# Patient Record
Sex: MALE | Race: WHITE | NOT HISPANIC OR LATINO | Employment: UNEMPLOYED | URBAN - METROPOLITAN AREA
[De-identification: names, ages, dates, MRNs, and addresses within clinical notes are randomized per-mention and may not be internally consistent; named-entity substitution may affect disease eponyms.]

---

## 2021-04-30 ENCOUNTER — IMMUNIZATIONS (OUTPATIENT)
Dept: FAMILY MEDICINE CLINIC | Facility: HOSPITAL | Age: 31
End: 2021-04-30

## 2021-04-30 DIAGNOSIS — Z23 ENCOUNTER FOR IMMUNIZATION: Primary | ICD-10-CM

## 2021-04-30 PROCEDURE — 91300 SARS-COV-2 / COVID-19 MRNA VACCINE (PFIZER-BIONTECH) 30 MCG: CPT

## 2021-04-30 PROCEDURE — 0001A SARS-COV-2 / COVID-19 MRNA VACCINE (PFIZER-BIONTECH) 30 MCG: CPT

## 2021-05-21 ENCOUNTER — IMMUNIZATIONS (OUTPATIENT)
Dept: FAMILY MEDICINE CLINIC | Facility: HOSPITAL | Age: 31
End: 2021-05-21

## 2021-05-21 DIAGNOSIS — Z23 ENCOUNTER FOR IMMUNIZATION: Primary | ICD-10-CM

## 2021-05-21 PROCEDURE — 91300 SARS-COV-2 / COVID-19 MRNA VACCINE (PFIZER-BIONTECH) 30 MCG: CPT

## 2021-05-21 PROCEDURE — 0002A SARS-COV-2 / COVID-19 MRNA VACCINE (PFIZER-BIONTECH) 30 MCG: CPT

## 2022-06-30 ENCOUNTER — OFFICE VISIT (OUTPATIENT)
Dept: NEUROSURGERY | Facility: CLINIC | Age: 32
End: 2022-06-30
Payer: COMMERCIAL

## 2022-06-30 VITALS
BODY MASS INDEX: 22.96 KG/M2 | SYSTOLIC BLOOD PRESSURE: 110 MMHG | TEMPERATURE: 97.6 F | HEIGHT: 69 IN | WEIGHT: 155 LBS | RESPIRATION RATE: 16 BRPM | HEART RATE: 73 BPM | DIASTOLIC BLOOD PRESSURE: 87 MMHG

## 2022-06-30 DIAGNOSIS — G93.0 ARACHNOID CYST OF POSTERIOR CRANIAL FOSSA: Primary | ICD-10-CM

## 2022-06-30 PROCEDURE — 99203 OFFICE O/P NEW LOW 30 MIN: CPT | Performed by: PHYSICIAN ASSISTANT

## 2022-06-30 RX ORDER — FLUTICASONE PROPIONATE 50 MCG
SPRAY, SUSPENSION (ML) NASAL
COMMUNITY
Start: 2022-04-19

## 2022-06-30 RX ORDER — MELOXICAM 15 MG/1
TABLET ORAL
COMMUNITY
Start: 2022-06-06

## 2022-06-30 NOTE — PROGRESS NOTES
Neurosurgery Office Note  Galen Ga 32 y o  male MRN: [id number]      Assessment/Plan   Patient is a 32 yrs old young gentleman self referred for Abnormal MRI that was ordered by his ENT Dr for workups of  right ear hearing issues and left ear tinnitus for the past 2 yrs  Patient reports the tingling is annoying and he is not getting sleep at night  MRI done shows an incidental retirocerebellar Arachnoid cyst measuring 2 4 x 1 8 x 3 2 cm right retro cerebellar region  No lesion in the IAC  Patient denies any headache, nausea, vomiting, gait issues, tremors, seizures, speech or swallowing problems  Denies dizziness/vertigo  No weakness in the limbs  No B/B dysfunction  Patient is following up with his ENT Dr  for his hearing problem and tinnitus  Exam- A&OX3, PERRL, EOMI 2 mm conj bilaterally  SF  Tiffany  Finger to nose test normal and without drift bilaterally  Strength 5/5 and sensation to LT intact throughout  DTR 2+  No clonus in both legs  Gait stable on heel to toes walking  Negative Romberg's test     Hx, PEx and images reviewed with the patient  Mx plan discussed  Dr Lili Sawyer reviewed the images  His symptoms is not related to his Arachnoid cyst  and recommends no surgical intervention or regular  follow up images  is necessary  Advised patient to follow up with his ENT Dr  Call office with new neurological symptoms or go to ER  All questions were answered to patient's satisfaction  Patient expressed his understandings and  agreed with the plan  Plan:   1  F/U MRI with and without in 6 months  2  Advised to follow up with his ENT Dr  3  Please go to ER with new neurological symptoms  4  Call with question or concern  5  Otherwise, follow up on PRN basis  I spent 30 minutes with the patient today in which >50% of the time was spent counseling/coordination of care regarding diagnosis, imaging review, symptoms and treatment plan       C/C: " self referred for Abnormal brain MRI"    Chief Complaint Patient presents with    Consult     NP MRI BRAIN AND IAC 6/15/22 [facility] in PACS           History of Present Illness     Patient is a 32 yrs old young gentleman with significant past medical history - self referred for Abnormal MRI that was ordered by his ENT Dr for workups of  right ear hearing issues and left ear tinnitus for the past 2 yrs  Patient reports the tingling is annoying and he is not getting sleep at night  MRI done shows an incidental retirocerebellar Arachnoid cyst  No lesion in the IAC  Patient denies any headache, nausea, vomiting, gait issues, tremors, seizures, speech or swallowing problems  Denies dizziness/vertigo  No weakness in the limbs  No B/B dysfunction  Patient is following up with his ENT Dr  for his hearing problem and tinnitus  Patient denies diabetes mellitus, hypertension, congestive heart failure, stroke, seizures, disorders, or taking anticoagulant medication  As fever, chills, rigors, cough or chest-pain  Denies history of smoking cigarettes or drinking alcohol  Images findings as described in the assessment section above  REVIEW OF SYSTEMS    Review of Systems   Constitutional: Negative  Negative for activity change and fatigue  HENT: Positive for hearing loss (right ear) and tinnitus (left ear)  Eyes: Negative  Negative for photophobia and visual disturbance  Respiratory: Negative  Cardiovascular: Negative  Gastrointestinal: Negative  Negative for constipation, nausea and vomiting  Endocrine: Negative  Genitourinary: Negative  Negative for frequency and urgency  Musculoskeletal: Positive for back pain and myalgias (muscle spasms in left calf)  Skin: Negative  Allergic/Immunologic: Negative  Neurological: Positive for dizziness (occasional), light-headedness (dehydration ?) and numbness (and tingling in left leg)  Negative for tremors, seizures, speech difficulty, weakness and headaches (occasional)          Pain 0/10    Hearing loss was his first symptom 2-3 years ago  No ST/PT/OT    No previous Brain Sx    No falls   Hematological: Negative  Does not bruise/bleed easily  Psychiatric/Behavioral: Positive for sleep disturbance  Negative for confusion and decreased concentration  Meds/Allergies     Current Outpatient Medications   Medication Sig Dispense Refill    fluticasone (FLONASE) 50 mcg/act nasal spray SPRAY 1 SPRAY INTO EACH NOSTRIL TWICE A DAY      meloxicam (MOBIC) 15 mg tablet TAKE 1 TABLET BY MOUTH EVERY DAY FOR 30 DAYS       No current facility-administered medications for this visit  Allergies   Allergen Reactions    Amoxicillin Rash     Break out       PAST HISTORY    History reviewed  No pertinent past medical history  History reviewed  No pertinent surgical history  Social History     Tobacco Use    Smoking status: Never Smoker    Smokeless tobacco: Never Used   Substance Use Topics    Alcohol use: Not Currently    Drug use: Never       History reviewed  No pertinent family history  Above history personally reviewed  EXAM    Vitals:Blood pressure 110/87, pulse 73, temperature 97 6 °F (36 4 °C), temperature source Probe, resp  rate 16, height 5' 9" (1 753 m), weight 70 3 kg (155 lb)  ,Body mass index is 22 89 kg/m²  Physical Exam  Constitutional:       Appearance: Normal appearance  HENT:      Head: Normocephalic and atraumatic  Eyes:      Extraocular Movements: Extraocular movements intact  Pupils: Pupils are equal, round, and reactive to light  Cardiovascular:      Rate and Rhythm: Normal rate  Pulses: Normal pulses  Pulmonary:      Effort: Pulmonary effort is normal    Musculoskeletal:         General: Normal range of motion  Cervical back: Normal range of motion  Neurological:      General: No focal deficit present  Mental Status: He is alert  GCS: GCS eye subscore is 4  GCS verbal subscore is 5  GCS motor subscore is 6        Cranial Nerves: Cranial nerves are intact  Sensory: Sensation is intact  Motor: Motor function is intact  Coordination: Finger-Nose-Finger Test normal       Deep Tendon Reflexes: Reflexes are normal and symmetric  Reflex Scores:       Tricep reflexes are 2+ on the right side and 2+ on the left side  Bicep reflexes are 2+ on the right side and 2+ on the left side  Brachioradialis reflexes are 2+ on the right side and 2+ on the left side  Patellar reflexes are 2+ on the right side and 2+ on the left side  Achilles reflexes are 2+ on the right side and 2+ on the left side  Psychiatric:         Speech: Speech normal          Neurologic Exam     Mental Status   Speech: speech is normal   Level of consciousness: alert    Cranial Nerves     CN III, IV, VI   Pupils are equal, round, and reactive to light  Right pupil: Size: 2 mm  Shape: regular  Reactivity: brisk  Left pupil: Size: 2 mm  Shape: regular  Reactivity: brisk     Nystagmus: none     CN XI   CN XI normal      Motor Exam   Muscle bulk: normal  Overall muscle tone: normal  Right arm tone: normal  Left arm tone: normal  Right arm pronator drift: absent  Left arm pronator drift: absent  Right leg tone: normal  Left leg tone: normal    Sensory Exam   Light touch normal      Gait, Coordination, and Reflexes     Coordination   Finger to nose coordination: normal    Reflexes   Right brachioradialis: 2+  Left brachioradialis: 2+  Right biceps: 2+  Left biceps: 2+  Right triceps: 2+  Left triceps: 2+  Right patellar: 2+  Left patellar: 2+  Right achilles: 2+  Left achilles: 2+  Right : 2+  Left : 2+  Right Valentine: absent  Left Valentine: absent  Right ankle clonus: absent  Left pendular knee jerk: absent        MEDICAL DECISION MAKING    Imaging Studies:     MRI brain w wo contrast    Result Date: 6/24/2022  Narrative: 2 16 840 1 [id number]      I have personally reviewed pertinent reports   , I have personally reviewed pertinent films in PACS and I have personally reviewed pertinent films in PACS with a Radiologist

## 2022-07-18 ENCOUNTER — TELEPHONE (OUTPATIENT)
Dept: PAIN MEDICINE | Facility: CLINIC | Age: 32
End: 2022-07-18

## 2022-07-18 NOTE — TELEPHONE ENCOUNTER
Patient   [phone]  Dr Lea Lutz    Patient is calling in stating that someone form this office called him  However there are no message at this time confirming that  If someone called him please call him back thank you  Pt said the we should be getting something from Dr Caroline Rivera office   He that Dr Caroline Rivera wants him to get an injection on L5 S1

## 2022-08-01 NOTE — H&P (VIEW-ONLY)
Assessment:  1  Lumbar radiculopathy    2  Lumbar post-laminectomy syndrome        Plan:  Orders Placed This Encounter   Procedures    FL spine and pain procedure     Standing Status:   Future     Standing Expiration Date:   8/3/2026     Order Specific Question:   Reason for Exam:     Answer:   left L4-5 LESI     Order Specific Question:   Anticoagulant hold needed? Answer:   NO       New Medications Ordered This Visit   Medications    gabapentin (NEURONTIN) 300 mg capsule     Sig: TAKE 1 CAPSULE BY MOUTH EVERY DAY AT BEDTIME FOR 30 DAYS    loratadine (CLARITIN) 10 mg tablet     Sig: Take 1 tablet by mouth daily       My impressions and treatment recommendations were discussed in detail with the patient, who verbalized understanding and had no further questions  This is a 79-year-old male with history of L5-S1 left hemilaminectomy who presents to our office with chief complaint low back pain as well as significant cramping pain in the left calf  He has recent MRI of lumbar spine which shows left subarticular foraminal disc protrusion which may or may not be contributing to some of his symptoms  We discussed L4-5 interlaminar epidural steroid injection with a left parasagittal approach to help with relieving some of his symptoms  If no success with this approach, then would consider left L4 and L5 TFESI  This was discussed with the patient  He may also benefit from aquatic therapy, however he would like to try the injection 1st   He is due to return to [facility] in the future following injection therapy  He is otherwise neurologically intact on exam today  [place] Prescription Drug Monitoring Program report was reviewed and was appropriate     Complete risks and benefits including bleeding, infection, tissue reaction, nerve injury and allergic reaction were discussed  The approach was demonstrated using models and literature was provided   Verbal and written consent was obtained  Discharge instructions were provided  I personally saw and examined the patient and I agree with the above discussed plan of care  History of Present Illness:    Hiro Madrid is a 32 y o  male who presents to [facility] for initial evaluation of the above stated pain complaints  The patient has a past medical and chronic pain history as outlined in the assessment section  He was referred by Dr Suraj Cadet for consideration of lumbar epidural steroid injection  Patient is here with chief complaint of back pain, towards the left side with radiation into the left lower extremity  This is a chronic issue for the past 3 years  Patient reports work related injury about 3 years ago  He has notable history of L5-S1 left hemilaminectomy  Over the past month, intensity the pain has been moderate to severe  Current pain is 7/10  Nearly constant  No typical pattern  Described as cramping, pins and needles, numbness, sharpness  Reports weakness in the upper and lower extremities  Does not ambulate with assistive device  Is increased with lying down, standing, bending, sitting  No change with exercise, relaxation, coughing, sneezing  Has MRI of the lumbar spine showing hemilaminectomy changes at L5-S1 with left paracentral and left foraminal disc protrusion with minimal spinal stenosis and moderate left foraminal narrowing  Reports moderate relief with surgery, PT, exercise  No relief with heat/ice therapy  He reports that ever since his surgery his left leg goes numb , most notable when sitting on the toilet  He reports intense meng horse int he calf muscle as well whenever he stretches  The calf  He also has been having notable pain in the back as well over the past several months  Pain travels across the lower back  Does not smoke tobacco marijuana  Does not drink alcohol  Not on blood thinners    Not allergic to latex or contrast dye       Currently using meloxicam and gabapentin  Reports symptoms are gradually worsening  Review of Systems:    Review of Systems   Constitutional: Negative for fever and unexpected weight change  HENT: Negative for trouble swallowing  Eyes: Negative for visual disturbance  Respiratory: Negative for shortness of breath and wheezing  Cardiovascular: Negative for chest pain and palpitations  Gastrointestinal: Negative for constipation, diarrhea, nausea and vomiting  Endocrine: Negative for cold intolerance, heat intolerance and polydipsia  Genitourinary: Negative for difficulty urinating and frequency  Musculoskeletal: Negative for arthralgias, gait problem, joint swelling and myalgias  Skin: Negative for rash  Neurological: Negative for dizziness, seizures, syncope, weakness and headaches  Hematological: Does not bruise/bleed easily  Psychiatric/Behavioral: Negative for dysphoric mood  All other systems reviewed and are negative  There is no problem list on file for this patient  History reviewed  No pertinent past medical history  History reviewed  No pertinent surgical history  History reviewed  No pertinent family history      Social History     Occupational History    Not on file   Tobacco Use    Smoking status: Never Smoker    Smokeless tobacco: Never Used   Substance and Sexual Activity    Alcohol use: Not Currently    Drug use: Never    Sexual activity: Not on file         Current Outpatient Medications:     fluticasone (FLONASE) 50 mcg/act nasal spray, SPRAY 1 SPRAY INTO EACH NOSTRIL TWICE A DAY, Disp: , Rfl:     gabapentin (NEURONTIN) 300 mg capsule, TAKE 1 CAPSULE BY MOUTH EVERY DAY AT BEDTIME FOR 30 DAYS, Disp: , Rfl:     loratadine (CLARITIN) 10 mg tablet, Take 1 tablet by mouth daily, Disp: , Rfl:     meloxicam (MOBIC) 15 mg tablet, TAKE 1 TABLET BY MOUTH EVERY DAY FOR 30 DAYS, Disp: , Rfl:     Allergies   Allergen Reactions    Amoxicillin Rash     Break out       Physical Exam:    /85 (BP Location: Left arm, Patient Position: Sitting, Cuff Size: Standard)   Pulse 82   Ht 5' 9" (1 753 m)   Wt 72 kg (158 lb 12 8 oz)   BMI 23 45 kg/m²     Constitutional: normal, well developed, well nourished, alert, in no distress and non-toxic and no overt pain behavior  Eyes: anicteric  HEENT: grossly intact  Neck: supple, symmetric, trachea midline and no masses   Pulmonary:even and unlabored  Cardiovascular:No edema or pitting edema present  Skin:Normal without rashes or lesions and well hydrated  Psychiatric:Mood and affect appropriate  Neurologic:Cranial Nerves II-XII grossly intact  Musculoskeletal:normal     Lumbar Spine Exam    Appearance:  Normal lordosis  Palpation/Tenderness:  no tenderness or spasm  Sensory:  no sensory deficits noted  Motor Strength:  Left hip flexion:  5/5  Left hip extension:  5/5  Right hip flexion:  5/5  Right hip extension:  5/5  Left knee flexion:  5/5  Left knee extension:  5/5  Right knee flexion:  5/5  Right knee extension:  5/5  Left foot dorsiflexion:  5/5  Left foot plantar flexion:  5/5  Right foot dorsiflexion:  5/5  Right foot plantar flexion:  5/5  Reflexes:  Left Patellar:  2+   Right Patellar:  2+   Left Achilles:  2+   Right Achilles:  2+   Special Tests:  Seated slump test positive on left, neg on right      Imaging    7/14/2022  L-spine -- Magnetic Resonance   MRSPINE -- --       Impression    IMPRESSION:     Suggestion of left hemilaminectomy changes at L5-S1 with left paracentral to   left foraminal to left subarticular recess bulge/broad-based protrusion   producing minimal spinal stenosis and minimal to moderate left foraminal   narrowing  Narrative    MRI lumbar spine unenhanced     HISTORY: Low back pain with left lower extremity radiculopathy  Surgery 3 years   ago       COMPARISON: Fluoroscopy films 4/30/2019     TECHNIQUE: MRI of the lumbar spine was performed in the sagittal and axial planes utilizing short and long TE sequences without IV contrast      Report: There is a grade 1 retrolisthesis of L5 on S1  There is a subtle grade 1   anterolisthesis of L2 on L3  There is otherwise normal lordosis  There are Modic   type I and type II endplate changes at Z2-F9  There is interspace desiccation   and height loss at L5-S1  Conus medullaris terminates at T12-L1  From L1 to L4 5, there is no spinal or foraminal stenosis  At L5-S1, there are left hemilaminectomy changes  Note is made of a left   paracentral to left subarticular recess to left foraminal bulge/broad-based   protrusion resulting in minimal spinal stenosis  There is posterior displacement   of the traversing left S1 nerve root  There is minimal to moderate left   foraminal narrowing due to bulging and facet hypertrophy  Paraspinal soft tissues are unremarkable  Procedure Note    Antonella Kitchen MD - 07/14/2022   Formatting of this note might be different from the original    MRI lumbar spine unenhanced     HISTORY: Low back pain with left lower extremity radiculopathy  Surgery 3 years   ago  COMPARISON: Fluoroscopy films 4/30/2019     TECHNIQUE: MRI of the lumbar spine was performed in the sagittal and axial   planes utilizing short and long TE sequences without IV contrast      Report: There is a grade 1 retrolisthesis of L5 on S1  There is a subtle grade 1   anterolisthesis of L2 on L3  There is otherwise normal lordosis  There are Modic   type I and type II endplate changes at F4-F0  There is interspace desiccation   and height loss at L5-S1  Conus medullaris terminates at T12-L1  From L1 to L4 5, there is no spinal or foraminal stenosis  At L5-S1, there are left hemilaminectomy changes  Note is made of a left   paracentral to left subarticular recess to left foraminal bulge/broad-based   protrusion resulting in minimal spinal stenosis   There is posterior displacement   of the traversing left S1 nerve root  There is minimal to moderate left   foraminal narrowing due to bulging and facet hypertrophy  Paraspinal soft tissues are unremarkable  IMPRESSION:   IMPRESSION:     Suggestion of left hemilaminectomy changes at L5-S1 with left paracentral to   left foraminal to left subarticular recess bulge/broad-based protrusion   producing minimal spinal stenosis and minimal to moderate left foraminal   narrowing              FL spine and pain procedure    (Results Pending)       Orders Placed This Encounter   Procedures    FL spine and pain procedure

## 2022-08-01 NOTE — PROGRESS NOTES
Assessment:  1  Lumbar radiculopathy    2  Lumbar post-laminectomy syndrome        Plan:  Orders Placed This Encounter   Procedures    FL spine and pain procedure     Standing Status:   Future     Standing Expiration Date:   8/3/2026     Order Specific Question:   Reason for Exam:     Answer:   left L4-5 LESI     Order Specific Question:   Anticoagulant hold needed? Answer:   NO       New Medications Ordered This Visit   Medications    gabapentin (NEURONTIN) 300 mg capsule     Sig: TAKE 1 CAPSULE BY MOUTH EVERY DAY AT BEDTIME FOR 30 DAYS    loratadine (CLARITIN) 10 mg tablet     Sig: Take 1 tablet by mouth daily       My impressions and treatment recommendations were discussed in detail with the patient, who verbalized understanding and had no further questions  This is a 79-year-old male with history of L5-S1 left hemilaminectomy who presents to our office with chief complaint low back pain as well as significant cramping pain in the left calf  He has recent MRI of lumbar spine which shows left subarticular foraminal disc protrusion which may or may not be contributing to some of his symptoms  We discussed L4-5 interlaminar epidural steroid injection with a left parasagittal approach to help with relieving some of his symptoms  If no success with this approach, then would consider left L4 and L5 TFESI  This was discussed with the patient  He may also benefit from aquatic therapy, however he would like to try the injection 1st   He is due to return to [facility] in the future following injection therapy  He is otherwise neurologically intact on exam today  [place] Prescription Drug Monitoring Program report was reviewed and was appropriate     Complete risks and benefits including bleeding, infection, tissue reaction, nerve injury and allergic reaction were discussed  The approach was demonstrated using models and literature was provided   Verbal and written consent was obtained  Discharge instructions were provided  I personally saw and examined the patient and I agree with the above discussed plan of care  History of Present Illness:    Bubba Kirk is a 32 y o  male who presents to [facility] for initial evaluation of the above stated pain complaints  The patient has a past medical and chronic pain history as outlined in the assessment section  He was referred by Dr Kiran Mclean for consideration of lumbar epidural steroid injection  Patient is here with chief complaint of back pain, towards the left side with radiation into the left lower extremity  This is a chronic issue for the past 3 years  Patient reports work related injury about 3 years ago  He has notable history of L5-S1 left hemilaminectomy  Over the past month, intensity the pain has been moderate to severe  Current pain is 7/10  Nearly constant  No typical pattern  Described as cramping, pins and needles, numbness, sharpness  Reports weakness in the upper and lower extremities  Does not ambulate with assistive device  Is increased with lying down, standing, bending, sitting  No change with exercise, relaxation, coughing, sneezing  Has MRI of the lumbar spine showing hemilaminectomy changes at L5-S1 with left paracentral and left foraminal disc protrusion with minimal spinal stenosis and moderate left foraminal narrowing  Reports moderate relief with surgery, PT, exercise  No relief with heat/ice therapy  He reports that ever since his surgery his left leg goes numb , most notable when sitting on the toilet  He reports intense meng horse int he calf muscle as well whenever he stretches  The calf  He also has been having notable pain in the back as well over the past several months  Pain travels across the lower back  Does not smoke tobacco marijuana  Does not drink alcohol  Not on blood thinners    Not allergic to latex or contrast dye       Currently using meloxicam and gabapentin  Reports symptoms are gradually worsening  Review of Systems:    Review of Systems   Constitutional: Negative for fever and unexpected weight change  HENT: Negative for trouble swallowing  Eyes: Negative for visual disturbance  Respiratory: Negative for shortness of breath and wheezing  Cardiovascular: Negative for chest pain and palpitations  Gastrointestinal: Negative for constipation, diarrhea, nausea and vomiting  Endocrine: Negative for cold intolerance, heat intolerance and polydipsia  Genitourinary: Negative for difficulty urinating and frequency  Musculoskeletal: Negative for arthralgias, gait problem, joint swelling and myalgias  Skin: Negative for rash  Neurological: Negative for dizziness, seizures, syncope, weakness and headaches  Hematological: Does not bruise/bleed easily  Psychiatric/Behavioral: Negative for dysphoric mood  All other systems reviewed and are negative  There is no problem list on file for this patient  History reviewed  No pertinent past medical history  History reviewed  No pertinent surgical history  History reviewed  No pertinent family history      Social History     Occupational History    Not on file   Tobacco Use    Smoking status: Never Smoker    Smokeless tobacco: Never Used   Substance and Sexual Activity    Alcohol use: Not Currently    Drug use: Never    Sexual activity: Not on file         Current Outpatient Medications:     fluticasone (FLONASE) 50 mcg/act nasal spray, SPRAY 1 SPRAY INTO EACH NOSTRIL TWICE A DAY, Disp: , Rfl:     gabapentin (NEURONTIN) 300 mg capsule, TAKE 1 CAPSULE BY MOUTH EVERY DAY AT BEDTIME FOR 30 DAYS, Disp: , Rfl:     loratadine (CLARITIN) 10 mg tablet, Take 1 tablet by mouth daily, Disp: , Rfl:     meloxicam (MOBIC) 15 mg tablet, TAKE 1 TABLET BY MOUTH EVERY DAY FOR 30 DAYS, Disp: , Rfl:     Allergies   Allergen Reactions    Amoxicillin Rash     Break out       Physical Exam:    /85 (BP Location: Left arm, Patient Position: Sitting, Cuff Size: Standard)   Pulse 82   Ht 5' 9" (1 753 m)   Wt 72 kg (158 lb 12 8 oz)   BMI 23 45 kg/m²     Constitutional: normal, well developed, well nourished, alert, in no distress and non-toxic and no overt pain behavior  Eyes: anicteric  HEENT: grossly intact  Neck: supple, symmetric, trachea midline and no masses   Pulmonary:even and unlabored  Cardiovascular:No edema or pitting edema present  Skin:Normal without rashes or lesions and well hydrated  Psychiatric:Mood and affect appropriate  Neurologic:Cranial Nerves II-XII grossly intact  Musculoskeletal:normal     Lumbar Spine Exam    Appearance:  Normal lordosis  Palpation/Tenderness:  no tenderness or spasm  Sensory:  no sensory deficits noted  Motor Strength:  Left hip flexion:  5/5  Left hip extension:  5/5  Right hip flexion:  5/5  Right hip extension:  5/5  Left knee flexion:  5/5  Left knee extension:  5/5  Right knee flexion:  5/5  Right knee extension:  5/5  Left foot dorsiflexion:  5/5  Left foot plantar flexion:  5/5  Right foot dorsiflexion:  5/5  Right foot plantar flexion:  5/5  Reflexes:  Left Patellar:  2+   Right Patellar:  2+   Left Achilles:  2+   Right Achilles:  2+   Special Tests:  Seated slump test positive on left, neg on right      Imaging    7/14/2022  L-spine -- Magnetic Resonance   MRSPINE -- --       Impression    IMPRESSION:     Suggestion of left hemilaminectomy changes at L5-S1 with left paracentral to   left foraminal to left subarticular recess bulge/broad-based protrusion   producing minimal spinal stenosis and minimal to moderate left foraminal   narrowing  Narrative    MRI lumbar spine unenhanced     HISTORY: Low back pain with left lower extremity radiculopathy  Surgery 3 years   ago       COMPARISON: Fluoroscopy films 4/30/2019     TECHNIQUE: MRI of the lumbar spine was performed in the sagittal and axial planes utilizing short and long TE sequences without IV contrast      Report: There is a grade 1 retrolisthesis of L5 on S1  There is a subtle grade 1   anterolisthesis of L2 on L3  There is otherwise normal lordosis  There are Modic   type I and type II endplate changes at B6-S4  There is interspace desiccation   and height loss at L5-S1  Conus medullaris terminates at T12-L1  From L1 to L4 5, there is no spinal or foraminal stenosis  At L5-S1, there are left hemilaminectomy changes  Note is made of a left   paracentral to left subarticular recess to left foraminal bulge/broad-based   protrusion resulting in minimal spinal stenosis  There is posterior displacement   of the traversing left S1 nerve root  There is minimal to moderate left   foraminal narrowing due to bulging and facet hypertrophy  Paraspinal soft tissues are unremarkable  Procedure Note    Amisha Singh MD - 07/14/2022   Formatting of this note might be different from the original    MRI lumbar spine unenhanced     HISTORY: Low back pain with left lower extremity radiculopathy  Surgery 3 years   ago  COMPARISON: Fluoroscopy films 4/30/2019     TECHNIQUE: MRI of the lumbar spine was performed in the sagittal and axial   planes utilizing short and long TE sequences without IV contrast      Report: There is a grade 1 retrolisthesis of L5 on S1  There is a subtle grade 1   anterolisthesis of L2 on L3  There is otherwise normal lordosis  There are Modic   type I and type II endplate changes at N7-O2  There is interspace desiccation   and height loss at L5-S1  Conus medullaris terminates at T12-L1  From L1 to L4 5, there is no spinal or foraminal stenosis  At L5-S1, there are left hemilaminectomy changes  Note is made of a left   paracentral to left subarticular recess to left foraminal bulge/broad-based   protrusion resulting in minimal spinal stenosis   There is posterior displacement   of the traversing left S1 nerve root  There is minimal to moderate left   foraminal narrowing due to bulging and facet hypertrophy  Paraspinal soft tissues are unremarkable  IMPRESSION:   IMPRESSION:     Suggestion of left hemilaminectomy changes at L5-S1 with left paracentral to   left foraminal to left subarticular recess bulge/broad-based protrusion   producing minimal spinal stenosis and minimal to moderate left foraminal   narrowing              FL spine and pain procedure    (Results Pending)       Orders Placed This Encounter   Procedures    FL spine and pain procedure

## 2022-08-03 ENCOUNTER — CONSULT (OUTPATIENT)
Dept: PAIN MEDICINE | Facility: CLINIC | Age: 32
End: 2022-08-03
Payer: COMMERCIAL

## 2022-08-03 VITALS
BODY MASS INDEX: 23.52 KG/M2 | DIASTOLIC BLOOD PRESSURE: 85 MMHG | SYSTOLIC BLOOD PRESSURE: 120 MMHG | HEART RATE: 82 BPM | HEIGHT: 69 IN | WEIGHT: 158.8 LBS

## 2022-08-03 DIAGNOSIS — M96.1 LUMBAR POST-LAMINECTOMY SYNDROME: ICD-10-CM

## 2022-08-03 DIAGNOSIS — M54.16 LUMBAR RADICULOPATHY: Primary | ICD-10-CM

## 2022-08-03 PROCEDURE — 99204 OFFICE O/P NEW MOD 45 MIN: CPT | Performed by: STUDENT IN AN ORGANIZED HEALTH CARE EDUCATION/TRAINING PROGRAM

## 2022-08-03 RX ORDER — LORATADINE 10 MG/1
1 TABLET ORAL DAILY
COMMUNITY

## 2022-08-03 RX ORDER — GABAPENTIN 300 MG/1
CAPSULE ORAL
COMMUNITY
Start: 2022-07-19

## 2022-08-03 NOTE — PATIENT INSTRUCTIONS
Epidural Steroid Injection   AMBULATORY CARE:   What you need to know about an epidural steroid injection (CINDY):  An CINDY is a procedure to inject steroid medicine into the epidural space  The epidural space is between your spinal cord and vertebrae  Steroids reduce inflammation and fluid buildup in your spine that may be causing pain  You may be given pain medicine along with the steroids  How to prepare for an CINDY:  Your healthcare provider will talk to you about how to prepare for your procedure  He or she will tell you what medicines to take or not take on the day of your procedure  You may need to stop taking blood thinners or other medicines several days before your procedure  You may need to adjust any diabetes medicine you take on the day of your procedure  Steroid medicine can increase your blood sugar level  Arrange for someone to drive you home when you are discharged  What will happen during an CINDY:   You will be given medicine to numb the procedure area  You will be awake for the procedure, but you will not feel pain  You may also be given medicine to help you relax  Contrast liquid will be used to help your healthcare provider see the area better  Tell the healthcare provider if you have ever had an allergic reaction to contrast liquid  Your healthcare provider may place the needle into your neck area, middle of your back, or tailbone area  He may inject the medicine next to the nerves that are causing your pain  He may instead inject the medicine into a larger area of the epidural space  This helps the medicine spread to more nerves  Your healthcare provider will use a fluoroscope to help guide the needle to the right place  A fluoroscope is a type of x-ray  After the procedure, a bandage will be placed over the injection site to prevent infection  What will happen after an CINDY:  You will have a bandage over the injection site to prevent infection   Your healthcare provider will tell you when you can bathe and any activity guidelines  You will be able to go home  Risks of an CINDY:  You may have temporary or permanent nerve damage or paralysis  You may have bleeding or develop a serious infection, such as meningitis (swelling of the brain coverings)  An abscess may also develop  An abscess is a pus-filled area under the skin  You may need surgery to fix the abscess  You may have a seizure, anxiety, or trouble sleeping  If you are a man, you may have temporary erectile dysfunction (not able to have an erection)  Call your local emergency number (911 in the [address] [place] [address],[address]) if:   You have a seizure  You have trouble moving your legs  Seek care immediately if:   Blood soaks through your bandage  You have a fever or chills, severe back pain, and the procedure area is sensitive to the touch  You cannot control when you urinate or have a bowel movement  Call your doctor if:   You have weakness or numbness in your legs  Your wound is red, swollen, or draining pus  You have nausea or are vomiting  Your face or neck is red and you feel warm  You have more pain than you had before the procedure  You have swelling in your hands or feet  You have questions or concerns about your condition or care  Care for your wound as directed: You may remove the bandage before you go to bed the day of your procedure  You may take a shower, but do not take a bath for at least 24 hours  Self-care:   Do not drive,  use machines, or do strenuous activity for 24 hours after your procedure or as directed  Continue other treatments  as directed  Steroid injections alone will not control your pain  The injections are meant to be used with other treatments, such as physical therapy  Follow up with your doctor as directed:  Write down your questions so you remember to ask them during your visits     © Copyright [employer] 2022 Information is for End User's use only and may not be sold, redistributed or otherwise used for commercial purposes  All illustrations and images included in CareNotes® are the copyrighted property of A D A M [employer] Inc  or [employer]  The above information is an  only  It is not intended as medical advice for individual conditions or treatments  Talk to your doctor, nurse or pharmacist before following any medical regimen to see if it is safe and effective for you

## 2022-08-12 ENCOUNTER — TELEPHONE (OUTPATIENT)
Dept: PAIN MEDICINE | Facility: MEDICAL CENTER | Age: 32
End: 2022-08-12

## 2022-08-16 NOTE — TELEPHONE ENCOUNTER
Scheduled patient for 8/23/22  Patient denies RX blood thinners/ NSAIDS  Nothing to eat or drink 1 hour prior to procedure  Needs to arrange transportation  Proper clothing for procedure  No vaccines 2 weeks prior or after procedure  If ill or place on antibiotics, please call to reschedule

## 2022-08-23 ENCOUNTER — HOSPITAL ENCOUNTER (OUTPATIENT)
Dept: RADIOLOGY | Facility: CLINIC | Age: 32
Discharge: HOME | End: 2022-08-23
Payer: COMMERCIAL

## 2022-08-23 VITALS
OXYGEN SATURATION: 99 % | SYSTOLIC BLOOD PRESSURE: 112 MMHG | TEMPERATURE: 97.4 F | HEART RATE: 80 BPM | RESPIRATION RATE: 18 BRPM | DIASTOLIC BLOOD PRESSURE: 77 MMHG

## 2022-08-23 DIAGNOSIS — M54.16 LUMBAR RADICULOPATHY: ICD-10-CM

## 2022-08-23 PROCEDURE — 62323 NJX INTERLAMINAR LMBR/SAC: CPT | Performed by: STUDENT IN AN ORGANIZED HEALTH CARE EDUCATION/TRAINING PROGRAM

## 2022-08-23 RX ORDER — METHYLPREDNISOLONE ACETATE 80 MG/ML
80 INJECTION, SUSPENSION INTRA-ARTICULAR; INTRALESIONAL; INTRAMUSCULAR; PARENTERAL; SOFT TISSUE ONCE
Status: COMPLETED | OUTPATIENT
Start: 2022-08-23 | End: 2022-08-23

## 2022-08-23 RX ADMIN — METHYLPREDNISOLONE ACETATE 80 MG: 80 INJECTION, SUSPENSION INTRA-ARTICULAR; INTRALESIONAL; INTRAMUSCULAR; PARENTERAL; SOFT TISSUE at 15:00

## 2022-08-23 RX ADMIN — IOHEXOL 1 ML: 300 INJECTION, SOLUTION INTRAVENOUS at 15:00

## 2022-08-23 NOTE — INTERVAL H&P NOTE
Update: (This section must be completed if the H&P was completed greater than 24 hrs to procedure or admission)    H&P reviewed  After examining the patient, I find no changed to the H&P since it had been written  Patient re-evaluated   Accept as history and physical     Dorys Serrano MD/August 23, 2022/2:50 PM

## 2022-08-23 NOTE — DISCHARGE INSTR - LAB
Epidural Steroid Injection   WHAT YOU NEED TO KNOW:   An epidural steroid injection (CINDY) is a procedure to inject steroid medicine into the epidural space  The epidural space is between your spinal cord and vertebrae  Steroids reduce inflammation and fluid buildup in your spine that may be causing pain  You may be given pain medicine along with the steroids  ACTIVITY  Do not drive or operate machinery today  No strenuous activity today - bending, lifting, etc   You may resume normal activites starting tomorrow - start slowly and as tolerated  You may shower today, but no tub baths or hot tubs  You may have numbness for several hours from the local anesthetic  Please use caution and common sense, especially with weight-bearing activities  CARE OF THE INJECTION SITE  If you have soreness or pain, apply ice to the area today (20 minutes on/20 minutes off)  Starting tomorrow, you may use warm, moist heat or ice if needed  You may have an increase or change in your discomfort for 36-48 hours after your treatment  Apply ice and continue with any pain medication you have been prescribed  Notify the [facility] if you have any of the following: redness, drainage, swelling, headache, stiff neck or fever above 100°F     SPECIAL INSTRUCTIONS  Our office will contact you in approximately 7 days for a progress report  MEDICATIONS  Continue to take all routine medications  Our office may have instructed you to hold some medications  As no general anesthesia was used in today's procedure, you should not experience any side effects related to anesthesia  If you are diabetic, the steroids used in today's injection may temporarily increase your blood sugar levels after the first few days after your injection  Please keep a close eye on your sugars and alert the doctor who manages your diabetes if your sugars are significantly high from your baseline or you are symptomatic       If you have a problem specifically related to your procedure, please call our office at [phone]  Problems not related to your procedure should be directed to your primary care physician

## 2023-02-15 NOTE — INTERVAL H&P NOTE
Pt admitted to room from PACU  Oriented to room and call light/tv controls. Bed in lowest position, wheels locked, 2/4 side rails up  Call light in reach, room free of clutter, adequate lighting provided. Update: (This section must be completed if the H&P was completed greater than 24 hrs to procedure or admission)    H&P reviewed  After examining the patient, I find no changed to the H&P since it had been written  Patient re-evaluated   Accept as history and physical     Ignacio Tariq MD/August 23, 2022/2:48 PM

## 2023-05-29 ENCOUNTER — OFFICE VISIT (OUTPATIENT)
Dept: URGENT CARE | Facility: CLINIC | Age: 33
End: 2023-05-29

## 2023-05-29 VITALS
DIASTOLIC BLOOD PRESSURE: 78 MMHG | SYSTOLIC BLOOD PRESSURE: 122 MMHG | OXYGEN SATURATION: 98 % | RESPIRATION RATE: 16 BRPM | WEIGHT: 185.13 LBS | TEMPERATURE: 98.4 F | HEART RATE: 98 BPM | BODY MASS INDEX: 27.34 KG/M2

## 2023-05-29 DIAGNOSIS — R07.89 CHEST TIGHTNESS: ICD-10-CM

## 2023-05-29 DIAGNOSIS — M54.42 ACUTE LEFT-SIDED LOW BACK PAIN WITH LEFT-SIDED SCIATICA: Primary | ICD-10-CM

## 2023-05-29 RX ORDER — CYCLOBENZAPRINE HCL 5 MG
5 TABLET ORAL 3 TIMES DAILY PRN
Qty: 21 TABLET | Refills: 0 | Status: SHIPPED | OUTPATIENT
Start: 2023-05-29 | End: 2023-06-05

## 2023-05-29 NOTE — PROGRESS NOTES
[facility]        NAME: Aádn Laboy is a 28 y o  male  : 1990    MRN: [id number]  DATE: May 29, 2023  TIME: 1:21 PM    Assessment and Plan   Acute left-sided low back pain with left-sided sciatica [M54 42]  1  Acute left-sided low back pain with left-sided sciatica  cyclobenzaprine (FLEXERIL) 5 mg tablet      2  Chest tightness              Patient Instructions   Patient Instructions   Follow-up with your primary care provider in the next 2-3 days  Any new or worsening symptoms develop get re-evaluated sooner or proceed to the ER  Don't drive or operate machinery while taking flexeril  Follow up with PCP in 3-5 days  Proceed to  ER if symptoms worsen  Chief Complaint     Chief Complaint   Patient presents with   • Back Pain     Lower back pain  Pt went to [place] 2 days ago and has been experencing pain  Painful to bend, sit, stand, walk  Pain radiates to left leg  Needs notes for work  • Wheezing     Bad for 1-2 months, post nasal drip, sneezing, coughing, no fever  Occ taking allergy meds  History of Present Illness       Patient presents with back pain after going to [facility] [place] 2 days ago and aggravating his back  Back pain described as in the lower back and radiating down the back of the left leg  Had an epidural shot last year for his sciatica  Tried tylenol yesterday and today once  Pain rated 8/10  Has numbness/tingling down the back of the legs  Denies incontinence, saddle anesthesia's, weakness, fevers,     Also tickle in throat, cough, and wheezing that's been ongoing for years  Also feels like he has to clear his throat  Has been ongoing for years but about 1 month ago got a little worse  Was told it might be acid reflux by his pcp  Sometimes gets some pressure in the chest for a few minutes, sometimes feels SOB with it as well  Has been ongoing for the past month but nothing for the past 2 weeks  Does have seasonal allergies         Review of Systems   Review of Systems   Constitutional: Negative for chills, fatigue and fever  HENT: Positive for congestion, postnasal drip and sore throat  Negative for ear discharge, ear pain, rhinorrhea, sinus pressure and sinus pain  Respiratory: Positive for chest tightness, shortness of breath and wheezing  Negative for cough  Cardiovascular: Negative for chest pain and palpitations  Gastrointestinal: Negative for abdominal pain  Genitourinary: Negative for difficulty urinating  Musculoskeletal: Positive for back pain  Negative for arthralgias and myalgias  Neurological: Negative for weakness and numbness  Psychiatric/Behavioral: Negative for confusion  Current Medications       Current Outpatient Medications:   •  cyclobenzaprine (FLEXERIL) 5 mg tablet, Take 1 tablet (5 mg total) by mouth 3 (three) times a day as needed for muscle spasms for up to 7 days, Disp: 21 tablet, Rfl: 0  •  fluticasone (FLONASE) 50 mcg/act nasal spray, , Disp: , Rfl:   •  gabapentin (NEURONTIN) 300 mg capsule, TAKE 1 CAPSULE BY MOUTH EVERY DAY AT BEDTIME FOR 30 DAYS (Patient not taking: Reported on 5/29/2023), Disp: , Rfl:   •  loratadine (CLARITIN) 10 mg tablet, Take 1 tablet by mouth daily (Patient not taking: Reported on 5/29/2023), Disp: , Rfl:   •  meloxicam (MOBIC) 15 mg tablet, TAKE 1 TABLET BY MOUTH EVERY DAY FOR 30 DAYS (Patient not taking: Reported on 5/29/2023), Disp: , Rfl:     Current Allergies     Allergies as of 05/29/2023 - Reviewed 05/29/2023   Allergen Reaction Noted   • Amoxicillin Rash 05/06/2018            The following portions of the patient's history were reviewed and updated as appropriate: allergies, current medications, past family history, past medical history, past social history, past surgical history and problem list      History reviewed  No pertinent past medical history  History reviewed  No pertinent surgical history  History reviewed   No pertinent family history  Medications have been verified  Objective   /78   Pulse 98   Temp 98 4 °F (36 9 °C)   Resp 16   Wt 84 kg (185 lb 2 oz)   SpO2 98%   BMI 27 34 kg/m²        Physical Exam     Physical Exam  Constitutional:       General: He is not in acute distress  Appearance: Normal appearance  He is not ill-appearing or diaphoretic  HENT:      Right Ear: Tympanic membrane, ear canal and external ear normal       Left Ear: Tympanic membrane, ear canal and external ear normal       Nose: Nose normal       Mouth/Throat:      Mouth: Mucous membranes are moist       Pharynx: Oropharynx is clear  Eyes:      Conjunctiva/sclera: Conjunctivae normal    Cardiovascular:      Rate and Rhythm: Normal rate and regular rhythm  Heart sounds: Normal heart sounds  Pulmonary:      Effort: Pulmonary effort is normal  No respiratory distress  Breath sounds: Normal breath sounds  No wheezing  Abdominal:      General: Abdomen is flat  Bowel sounds are normal       Palpations: Abdomen is soft  Musculoskeletal:         General: No tenderness  Normal range of motion  Comments: SLR positive on left side  Full AROM with 5/5 muscle strength of lower extremities bilaterally and lower back  Palpable muscle spasm in the left lower back    Skin:     General: Skin is warm and dry  Neurological:      Mental Status: He is alert     Psychiatric:         Mood and Affect: Mood normal          Behavior: Behavior normal

## 2023-05-29 NOTE — PATIENT INSTRUCTIONS
Follow-up with your primary care provider in the next 2-3 days  Any new or worsening symptoms develop get re-evaluated sooner or proceed to the ER  Don't drive or operate machinery while taking flexeril

## 2023-05-29 NOTE — LETTER
May 29, 2023     Patient: Nubia Crawford  YOB: 1990  Date of Visit: 5/29/2023      To Whom it May Concern:    Nubia Crawford is under my professional care  Kit Nowak was seen in my office on 5/29/2023  Kit Nowak may return to work on 05/31/2023   If you have any questions or concerns, please don't hesitate to call           Sincerely,          Daniela Jewell PA-C        CC: No Recipients

## 2023-05-30 LAB
ATRIAL RATE: 98 BPM
P AXIS: 0 DEGREES
PR INTERVAL: 110 MS
QRS AXIS: 68 DEGREES
QRSD INTERVAL: 80 MS
QT INTERVAL: 336 MS
QTC INTERVAL: 428 MS
T WAVE AXIS: 67 DEGREES
VENTRICULAR RATE: 98 BPM

## 2023-09-23 ENCOUNTER — OFFICE VISIT (OUTPATIENT)
Dept: URGENT CARE | Facility: CLINIC | Age: 33
End: 2023-09-23
Payer: COMMERCIAL

## 2023-09-23 VITALS
OXYGEN SATURATION: 98 % | RESPIRATION RATE: 18 BRPM | SYSTOLIC BLOOD PRESSURE: 130 MMHG | HEART RATE: 115 BPM | TEMPERATURE: 97.3 F | DIASTOLIC BLOOD PRESSURE: 82 MMHG

## 2023-09-23 DIAGNOSIS — R19.7 NAUSEA VOMITING AND DIARRHEA: Primary | ICD-10-CM

## 2023-09-23 DIAGNOSIS — R11.2 NAUSEA VOMITING AND DIARRHEA: Primary | ICD-10-CM

## 2023-09-23 PROCEDURE — S9088 SERVICES PROVIDED IN URGENT: HCPCS

## 2023-09-23 PROCEDURE — 99213 OFFICE O/P EST LOW 20 MIN: CPT

## 2023-09-23 RX ORDER — ONDANSETRON 4 MG/1
4 TABLET, ORALLY DISINTEGRATING ORAL ONCE
Status: COMPLETED | OUTPATIENT
Start: 2023-09-23 | End: 2023-09-23

## 2023-09-23 RX ADMIN — ONDANSETRON 4 MG: 4 TABLET, ORALLY DISINTEGRATING ORAL at 14:27

## 2023-09-23 NOTE — PATIENT INSTRUCTIONS
--Follow up with GI as planned    --Rest and drink plenty of fluids. Best is plain water or dilute juice (50% juice/50% water) or electrolye. Milk, soda, and overly sugar or acidic beverages should be avoided, however. --Begin to eat small amounts of bland solids such as crackers, bread, rice, pasta, bananas, or yogurt. Fried, spicy, greasy, and overly acidic foods (such as tomato sauce) should be avoided for now. If you throw up after eating, wait at least 3-4 hours before making another attempt.     --OTC Pepto-bismol may help with stomach discomfort, although it may turn your stools black. --Expect symptoms to last 3-5 days on average, followed by gradual improvement. It takes time for the virus to leave your system and for your GI tract to heal and resume normal functioning. --Monitor for signs and symptoms of dehydration including increased thirst, dry mouth, lightheadedness, as well as dark/infrequent/small amounts of urination. Should these occur, increase the amount of fluids you are drinking immediately. Should these continue, you should go immediately to the hospital as you will likely need IV fluids. --Go to the hospital if you experience increased abdominal pain, recurrent vomiting, significant blood in stool or emesis, or signs of dehydration (per above). --Contact your family doctor if your loose stools and stomach upset are still present after 7 days without showing signs of improvement.   At this time, further evaluation may be needed

## 2023-09-23 NOTE — LETTER
September 23, 2023     Patient: Ashley Benz   YOB: 1990   Date of Visit: 9/23/2023       To Whom it May Concern:    Ashley Benz was seen in my clinic on 9/23/2023. He should be excused 9/23 and 9/24/23. If you have any questions or concerns, please don't hesitate to call.          Sincerely,          ISS[facility] Bran        CC: No Recipients normal...

## 2023-09-23 NOTE — PROGRESS NOTES
[facility]    NAME: Earnestine Martel is a 28 y.o. male  : 1990    MRN: [id number]  DATE: 2023  TIME: 2:11 PM    Assessment and Plan   Nausea vomiting and diarrhea [R11.2, R19.7]  1. Nausea vomiting and diarrhea  ondansetron (ZOFRAN-ODT) dispersible tablet 4 mg        Suspected viral infectious causing symptoms   Discussed with patient could be chronic related   Could be viral infections   Possibility of gallbladder concerns - both occurring after pizza/greasy meal.   No signs of dehydration on examination today. Educated to increase fluids and to watch for signs of dehydration. Follow up with PCP in 3-5 days for. Patient Instructions     --Rest and drink plenty of fluids. Best is plain water or dilute juice (50% juice/50% water) or electrolye. Milk, soda, and overly sugar or acidic beverages should be avoided, however. --Begin to eat small amounts of bland solids such as crackers, bread, rice, pasta, bananas, or yogurt. Fried, spicy, greasy, and overly acidic foods (such as tomato sauce) should be avoided for now. If you throw up after eating, wait at least 3-4 hours before making another attempt.     --OTC Pepto-bismol may help with stomach discomfort, although it may turn your stools black. --Expect symptoms to last 3-5 days on average, followed by gradual improvement. It takes time for the virus to leave your system and for your GI tract to heal and resume normal functioning. --Monitor for signs and symptoms of dehydration including increased thirst, dry mouth, lightheadedness, as well as dark/infrequent/small amounts of urination. Should these occur, increase the amount of fluids you are drinking immediately. Should these continue, you should go immediately to the hospital as you will likely need IV fluids.      --Go to the hospital if you experience increased abdominal pain, recurrent vomiting, significant blood in stool or emesis, or signs of dehydration (per above). --Contact your family doctor if your loose stools and stomach upset are still present after 7 days without showing signs of improvement. At this time, further evaluation may be needed      Chief Complaint     Chief Complaint   Patient presents with   • Nausea   • Diarrhea     Verbalizes 3 episodes of diarrhea and nausea. States he started dated a [place] women over a 1year ago and thinks could be related to the type of food she cooks although hes had only 1 other episode 3 weeks ago. No interventions attempted. Does verbalizes ongoing GI problem with battling with loose stools most of his life. History of Present Illness       Presents with sick symptoms including nausea, diarrhea that started. He had 3 episodes of diarrhea. Uncertain if symptoms are related to new food he is trying - [place] food for over 1 year. But symptoms appear related to He has 1 episode that started 3 weeks ago. He has not tried interventions. Noted GI symptoms including BM after eating meals for most of this life. Review of Systems   Review of Systems   Constitutional: Negative for chills and fever. HENT: Negative for ear pain and sore throat. Respiratory: Negative for cough and shortness of breath. Cardiovascular: Negative for chest pain and palpitations. Gastrointestinal: Positive for diarrhea and nausea. Negative for abdominal pain and vomiting. Musculoskeletal: Negative for myalgias. Skin: Negative for color change and rash. Psychiatric/Behavioral: Negative for confusion. All other systems reviewed and are negative.         Current Medications       Current Outpatient Medications:   •  fluticasone (FLONASE) 50 mcg/act nasal spray, , Disp: , Rfl:   •  cyclobenzaprine (FLEXERIL) 5 mg tablet, Take 1 tablet (5 mg total) by mouth 3 (three) times a day as needed for muscle spasms for up to 7 days, Disp: 21 tablet, Rfl: 0  •  gabapentin (NEURONTIN) 300 mg capsule, TAKE 1 CAPSULE BY MOUTH EVERY DAY AT BEDTIME FOR 30 DAYS (Patient not taking: Reported on 5/29/2023), Disp: , Rfl:   •  loratadine (CLARITIN) 10 mg tablet, Take 1 tablet by mouth daily (Patient not taking: Reported on 5/29/2023), Disp: , Rfl:   •  meloxicam (MOBIC) 15 mg tablet, TAKE 1 TABLET BY MOUTH EVERY DAY FOR 30 DAYS (Patient not taking: Reported on 5/29/2023), Disp: , Rfl:     Current Facility-Administered Medications:   •  ondansetron (ZOFRAN-ODT) dispersible tablet 4 mg, 4 mg, Oral, Once, [facility] Ball    Current Allergies     Allergies as of 09/23/2023 - Reviewed 09/23/2023   Allergen Reaction Noted   • Amoxicillin Rash 05/06/2018            The following portions of the patient's history were reviewed and updated as appropriate: allergies, current medications, past family history, past medical history, past social history, past surgical history and problem list.     Past Medical History:   Diagnosis Date   • Diarrhea     "most of life"       History reviewed. No pertinent surgical history. History reviewed. No pertinent family history. Medications have been verified. Objective   /82   Pulse (!) 115   Temp (!) 97.3 °F (36.3 °C)   Resp 18   SpO2 98%        Physical Exam     Physical Exam  Vitals reviewed. Constitutional:       General: He is not in acute distress. Appearance: Normal appearance. HENT:      Right Ear: Tympanic membrane, ear canal and external ear normal.      Left Ear: Tympanic membrane, ear canal and external ear normal.      Nose: Nose normal.      Mouth/Throat:      Mouth: Mucous membranes are moist.      Pharynx: No posterior oropharyngeal erythema. Eyes:      Conjunctiva/sclera: Conjunctivae normal.   Cardiovascular:      Rate and Rhythm: Normal rate and regular rhythm. Pulses: Normal pulses. Heart sounds: Normal heart sounds. No murmur heard. Pulmonary:      Effort: Pulmonary effort is normal. No respiratory distress.       Breath sounds: Normal breath sounds. Abdominal:      General: There is no distension. Palpations: Abdomen is soft. Tenderness: There is generalized abdominal tenderness. There is no guarding. Skin:     General: Skin is warm and dry. Neurological:      General: No focal deficit present. Mental Status: He is alert and oriented to person, place, and time.    Psychiatric:         Mood and Affect: Mood normal.         Behavior: Behavior normal.

## 2023-10-06 RX ORDER — ALBUTEROL SULFATE 90 UG/1
2 AEROSOL, METERED RESPIRATORY (INHALATION) EVERY 6 HOURS PRN
COMMUNITY
Start: 2023-06-15

## 2023-10-06 RX ORDER — PREDNISONE 50 MG/1
50 TABLET ORAL DAILY
COMMUNITY
Start: 2023-06-15 | End: 2023-10-11

## 2023-10-11 ENCOUNTER — APPOINTMENT (OUTPATIENT)
Dept: LAB | Facility: HOSPITAL | Age: 33
End: 2023-10-11
Payer: COMMERCIAL

## 2023-10-11 ENCOUNTER — OFFICE VISIT (OUTPATIENT)
Dept: GASTROENTEROLOGY | Facility: CLINIC | Age: 33
End: 2023-10-11
Payer: COMMERCIAL

## 2023-10-11 VITALS
DIASTOLIC BLOOD PRESSURE: 80 MMHG | BODY MASS INDEX: 28.61 KG/M2 | OXYGEN SATURATION: 97 % | HEART RATE: 84 BPM | SYSTOLIC BLOOD PRESSURE: 122 MMHG | HEIGHT: 69 IN | WEIGHT: 193.2 LBS

## 2023-10-11 DIAGNOSIS — R11.2 NAUSEA VOMITING AND DIARRHEA: Primary | ICD-10-CM

## 2023-10-11 DIAGNOSIS — R11.2 NAUSEA VOMITING AND DIARRHEA: ICD-10-CM

## 2023-10-11 DIAGNOSIS — R19.7 NAUSEA VOMITING AND DIARRHEA: ICD-10-CM

## 2023-10-11 DIAGNOSIS — R19.7 NAUSEA VOMITING AND DIARRHEA: Primary | ICD-10-CM

## 2023-10-11 LAB
ALBUMIN SERPL BCP-MCNC: 4.9 G/DL (ref 3.5–5)
ALP SERPL-CCNC: 60 U/L (ref 34–104)
ALT SERPL W P-5'-P-CCNC: 11 U/L (ref 7–52)
ANION GAP SERPL CALCULATED.3IONS-SCNC: 8 MMOL/L
AST SERPL W P-5'-P-CCNC: 16 U/L (ref 13–39)
BASOPHILS # BLD AUTO: 0.08 THOUSANDS/ÂΜL (ref 0–0.1)
BASOPHILS NFR BLD AUTO: 1 % (ref 0–1)
BILIRUB SERPL-MCNC: 0.64 MG/DL (ref 0.2–1)
BUN SERPL-MCNC: 21 MG/DL (ref 5–25)
CALCIUM SERPL-MCNC: 9.7 MG/DL (ref 8.4–10.2)
CHLORIDE SERPL-SCNC: 103 MMOL/L (ref 96–108)
CO2 SERPL-SCNC: 29 MMOL/L (ref 21–32)
CREAT SERPL-MCNC: 1.05 MG/DL (ref 0.6–1.3)
EOSINOPHIL # BLD AUTO: 0.28 THOUSAND/ÂΜL (ref 0–0.61)
EOSINOPHIL NFR BLD AUTO: 4 % (ref 0–6)
ERYTHROCYTE [DISTWIDTH] IN BLOOD BY AUTOMATED COUNT: 12.3 % (ref 11.6–15.1)
ERYTHROCYTE [SEDIMENTATION RATE] IN BLOOD: 5 MM/HOUR (ref 0–14)
GFR SERPL CREATININE-BSD FRML MDRD: 93 ML/MIN/1.73SQ M
GLUCOSE P FAST SERPL-MCNC: 94 MG/DL (ref 65–99)
HCT VFR BLD AUTO: 48.6 % (ref 36.5–49.3)
HGB BLD-MCNC: 16 G/DL (ref 12–17)
IMM GRANULOCYTES # BLD AUTO: 0.02 THOUSAND/UL (ref 0–0.2)
IMM GRANULOCYTES NFR BLD AUTO: 0 % (ref 0–2)
LYMPHOCYTES # BLD AUTO: 1.88 THOUSANDS/ÂΜL (ref 0.6–4.47)
LYMPHOCYTES NFR BLD AUTO: 27 % (ref 14–44)
MCH RBC QN AUTO: 29.7 PG (ref 26.8–34.3)
MCHC RBC AUTO-ENTMCNC: 32.9 G/DL (ref 31.4–37.4)
MCV RBC AUTO: 90 FL (ref 82–98)
MONOCYTES # BLD AUTO: 0.47 THOUSAND/ÂΜL (ref 0.17–1.22)
MONOCYTES NFR BLD AUTO: 7 % (ref 4–12)
NEUTROPHILS # BLD AUTO: 4.13 THOUSANDS/ÂΜL (ref 1.85–7.62)
NEUTS SEG NFR BLD AUTO: 61 % (ref 43–75)
NRBC BLD AUTO-RTO: 0 /100 WBCS
PLATELET # BLD AUTO: 279 THOUSANDS/UL (ref 149–390)
PMV BLD AUTO: 9.4 FL (ref 8.9–12.7)
POTASSIUM SERPL-SCNC: 4.3 MMOL/L (ref 3.5–5.3)
PROT SERPL-MCNC: 8.1 G/DL (ref 6.4–8.4)
RBC # BLD AUTO: 5.38 MILLION/UL (ref 3.88–5.62)
SODIUM SERPL-SCNC: 140 MMOL/L (ref 135–147)
TSH SERPL DL<=0.05 MIU/L-ACNC: 3.17 UIU/ML (ref 0.45–4.5)
WBC # BLD AUTO: 6.86 THOUSAND/UL (ref 4.31–10.16)

## 2023-10-11 PROCEDURE — 36415 COLL VENOUS BLD VENIPUNCTURE: CPT

## 2023-10-11 PROCEDURE — 82784 ASSAY IGA/IGD/IGG/IGM EACH: CPT

## 2023-10-11 PROCEDURE — 80053 COMPREHEN METABOLIC PANEL: CPT

## 2023-10-11 PROCEDURE — 99213 OFFICE O/P EST LOW 20 MIN: CPT | Performed by: PHYSICIAN ASSISTANT

## 2023-10-11 PROCEDURE — 85652 RBC SED RATE AUTOMATED: CPT

## 2023-10-11 PROCEDURE — 86364 TISS TRNSGLTMNASE EA IG CLAS: CPT

## 2023-10-11 PROCEDURE — 86258 DGP ANTIBODY EACH IG CLASS: CPT

## 2023-10-11 PROCEDURE — 86231 EMA EACH IG CLASS: CPT

## 2023-10-11 PROCEDURE — 84443 ASSAY THYROID STIM HORMONE: CPT

## 2023-10-11 PROCEDURE — 85025 COMPLETE CBC W/AUTO DIFF WBC: CPT

## 2023-10-11 RX ORDER — FAMOTIDINE 20 MG/1
20 TABLET, FILM COATED ORAL 2 TIMES DAILY
Qty: 60 TABLET | Refills: 3 | Status: SHIPPED | OUTPATIENT
Start: 2023-10-11

## 2023-10-11 NOTE — PROGRESS NOTES
[facility] - Outpatient Consultation  [facility] Ross Carrera 28 y.o. male MRN: [id number]  Encounter: [id number]          ASSESSMENT AND PLAN:      1. Nausea vomiting and diarrhea  He notes a long history of frequent Bms daily - typically every time he eats  Recently with recurrent Nausea/vomiting typically in the morning    Suspect IBS and GERD  Will start Pepcid 20mg BID  Add Metamucil and Probiotic  Discussed dietary and lifestyle modifications which may be helpful  Will check labs    Unfortunately, he is losing his insurance after today - If labs are abnormal or symptoms persist/worsen he will need additional testing - US, EGD/Colonoscopy  He will be working on re-establishing insurance after today    ______________________________________________________________________    HPI: 40-year-old male with no significant past medical history who presents for evaluation of nausea, vomiting and frequent stools. He reports that he has struggled with his bowel movements for many years. He states that for most of his adult life he remembers having to have a bowel movement after eating. Sometimes it is quick and easy to pass other times he notes he has to sit on the toilet for almost 1/2 an hour before he feels like he is empty. He notes that sometimes the stool is loose other times it is formed. He reports that rarely it is watery. He reports that recently the color has changed slightly to be more green. He denies any severe abdominal pain. He notes that over the past 2 months or so he has been having episodes of nausea and vomiting. This is new. He reports that the symptoms typically occurs in the morning. He admits that he recently went back to work full-time. Because of this he is having to wake up earlier.   He admits that over the past year he has been eating more [place] food that he ever did before but he is not sure that this is what is causing his symptoms as he was doing this for a long time before the vomiting started. He reports occasional episodes of heartburn which he feels as chest discomfort. He occasionally takes an acids which does help. He has no dysphagia. He denies any hematemesis or melena. He has no rectal bleeding. He denies any recent unexpected weight loss. He denies any family history of any serious gastrointestinal disorders. He has never seen a gastroenterologist and therefore never had an EGD or colonoscopy. REVIEW OF SYSTEMS:    CONSTITUTIONAL: Denies any fever, chills, rigors, and weight loss. HEENT: No earache or tinnitus. Denies hearing loss or visual disturbances. CARDIOVASCULAR: No chest pain or palpitations. RESPIRATORY: Denies any cough, hemoptysis, shortness of breath or dyspnea on exertion. GASTROINTESTINAL: As noted in the History of Present Illness. GENITOURINARY: No problems with urination. Denies any hematuria or dysuria. NEUROLOGIC: No dizziness or vertigo, denies headaches. MUSCULOSKELETAL: Denies any muscle or joint pain. SKIN: Denies skin rashes or itching. ENDOCRINE: Denies excessive thirst. Denies intolerance to heat or cold. PSYCHOSOCIAL: Denies depression or anxiety. Denies any recent memory loss. Historical Information   Past Medical History:   Diagnosis Date    Diarrhea     "most of life"     Past Surgical History:   Procedure Laterality Date    BACK SURGERY  2020    REPLACEMENT TOTAL KNEE  2019     Social History   Social History     Substance and Sexual Activity   Alcohol Use Not Currently     Social History     Substance and Sexual Activity   Drug Use Never     Social History     Tobacco Use   Smoking Status Never   Smokeless Tobacco Never     History reviewed. No pertinent family history.     Meds/Allergies       Current Outpatient Medications:     albuterol (PROVENTIL HFA,VENTOLIN HFA) 90 mcg/act inhaler    Allergies   Allergen Reactions    Amoxicillin Rash     Break out           Objective     Blood pressure 122/80, pulse 84, height 5' 9" (1.753 m), weight 87.6 kg (193 lb 3.2 oz), SpO2 97 %. Body mass index is 28.53 kg/m². PHYSICAL EXAM:      General Appearance:   Alert, cooperative, no distress   HEENT:   Normocephalic, atraumatic, anicteric. Neck:  Supple, symmetrical, trachea midline   Lungs:   Clear to auscultation bilaterally; no rales, rhonchi or wheezing; respirations unlabored    Heart[de-identified]   Regular rate and rhythm; no murmur, rub, or gallop. Abdomen:   Soft, non-tender, non-distended; normal bowel sounds; no masses, no organomegaly    Genitalia:   Deferred    Rectal:   Deferred    Extremities:  No cyanosis, clubbing or edema    Pulses:  2+ and symmetric    Skin:  No jaundice, rashes, or lesions    Lymph nodes:  No palpable cervical lymphadenopathy        Lab Results:   No visits with results within 1 Day(s) from this visit. Latest known visit with results is:   Office Visit on 05/29/2023   Component Date Value    Ventricular Rate 05/29/2023 98     Atrial Rate 05/29/2023 98     OK Interval 05/29/2023 110     QRSD Interval 05/29/2023 80     QT Interval 05/29/2023 336     QTC Interval 05/29/2023 428     P Axis 05/29/2023 0     QRS Axis 05/29/2023 68     T Wave [place] 05/29/2023 67          Radiology Results:   No results found.

## 2023-10-11 NOTE — TELEPHONE ENCOUNTER
Pt call transferred to nurses line     He asked to have famotidine 20 mg sent to pharmacy. Pharmacy on file confirmed. He also inquired on brand of probiotic. I advised on Align prebiotic + probiotic.

## 2023-10-13 LAB
ENDOMYSIUM IGA SER QL: NEGATIVE
GLIADIN PEPTIDE IGA SER-ACNC: 7 UNITS (ref 0–19)
GLIADIN PEPTIDE IGG SER-ACNC: 2 UNITS (ref 0–19)
IGA SERPL-MCNC: 358 MG/DL (ref 90–386)
TTG IGA SER-ACNC: <2 U/ML (ref 0–3)
TTG IGG SER-ACNC: <2 U/ML (ref 0–5)

## 2024-05-07 NOTE — PROGRESS NOTES
Pain Medicine Follow-Up Note    Assessment:  1. Lumbar radiculopathy    2. Lumbar post-laminectomy syndrome    3. Chronic pain syndrome        Plan:  Orders Placed This Encounter   Procedures    FL spine and pain procedure     Standing Status:   Future     Standing Expiration Date:   5/8/2028     Order Specific Question:   Reason for Exam:     Answer:   left L5 and S1 TFESI     Order Specific Question:   Anticoagulant hold needed?     Answer:   no       No orders of the defined types were placed in this encounter.      My impressions and treatment recommendations were discussed in detail with the patient who verbalized understanding and had no further questions.      This is a 33-year-old male who returns to our office after quite some time.  Last seen in 2022 for L4-5 lumbar epidural steroid injection with 50% relief of his lower back pain for 6 months.  This unfortunately did not help with his left posterior calf pain.      Reviewed his MRI report again and discussed that he likely has recurrent disc herniation at L5-S1 causing S1 impingement and posterior leg pain.  We discussed repeating epidural injection from a transforaminal approach at the left L5 and S1 levels in an effort to see if this helps more with his left lower extremity pain.    We did discuss addition of additional neuropathic agents such as Cymbalta, given failure to Lyrica and gabapentin.  However we will hold off on these until we see response to the injection.    He otherwise will continue management with [facility] orthopedics.  He is trying to avoid surgery.  I did discuss spinal cord stimulator as a potential treatment option.  He is not too keen on implantable procedures.       [place] Prescription Drug Monitoring Program report was reviewed and was appropriate     Complete risks and benefits including bleeding, infection, tissue reaction, nerve injury and allergic reaction were discussed. The approach was demonstrated using models  "and literature was provided. Verbal and written consent was obtained.      Discharge instructions were provided. I personally saw and examined the patient and I agree with the above discussed plan of care.    History of Present Illness:    Per Rogers Jr is a 33 y.o. male who presents to [facility] for interval re-evaluation of the above stated pain complaints. The patient has a past medical and chronic pain history as outlined in the assessment section. He was last seen on 8/23/2022 for left L4-5 LESI. Returns today with recurrent symptoms of lower back pain and cramping in the left calf.    Reports injection reduced back pain by 50% for 6 months. Calf pain did not improve at all. He still has calf pain with prolonged sitting    In review, patient has history of L5-S1 left hemilaminectomy..    Has MRI of the lumbar spine showing hemilaminectomy changes at L5-S1 with left paracentral and left foraminal disc protrusion with minimal spinal stenosis and moderate left foraminal narrowing.       Other than as stated above, the patient denies any interval changes in medications, medical condition, mental condition, symptoms, or allergies since the last office visit.         Review of Systems:    Review of Systems   Cardiovascular:  Positive for chest pain.   Musculoskeletal:  Positive for back pain and gait problem.        DROM  Muscle weakness  Joint stiffness         Past Medical History:   Diagnosis Date    Diarrhea     \"most of life\"       Past Surgical History:   Procedure Laterality Date    BACK SURGERY  2020    REPLACEMENT TOTAL KNEE  2019       History reviewed. No pertinent family history.    Social History     Occupational History    Not on file   Tobacco Use    Smoking status: Never    Smokeless tobacco: Never   Vaping Use    Vaping status: Never Used   Substance and Sexual Activity    Alcohol use: Not Currently    Drug use: Never    Sexual activity: Not on file         Current " Outpatient Medications:     albuterol (PROVENTIL HFA,VENTOLIN HFA) 90 mcg/act inhaler, Inhale 2 puffs every 6 (six) hours as needed prn, Disp: , Rfl:     famotidine (PEPCID) 20 mg tablet, Take 1 tablet (20 mg total) by mouth 2 (two) times a day, Disp: 60 tablet, Rfl: 3    Allergies   Allergen Reactions    Amoxicillin Rash     Break out       Physical Exam:    /88   Pulse 88   Wt 96.2 kg (212 lb)   BMI 31.31 kg/m²     Constitutional:normal, well developed, well nourished, alert, in no distress and non-toxic and no overt pain behavior.  Eyes:anicteric  HEENT:grossly intact  Neck:supple, symmetric, trachea midline and no masses   Pulmonary:even and unlabored  Cardiovascular:No edema or pitting edema present  Skin:Normal without rashes or lesions and well hydrated  Psychiatric:Mood and affect appropriate  Neurologic:Cranial Nerves II-XII grossly intact  Musculoskeletal:normal      Imaging  FL spine and pain procedure    (Results Pending)         Orders Placed This Encounter   Procedures    FL spine and pain procedure

## 2024-05-07 NOTE — H&P (VIEW-ONLY)
Pain Medicine Follow-Up Note    Assessment:  1. Lumbar radiculopathy    2. Lumbar post-laminectomy syndrome    3. Chronic pain syndrome        Plan:  Orders Placed This Encounter   Procedures    FL spine and pain procedure     Standing Status:   Future     Standing Expiration Date:   5/8/2028     Order Specific Question:   Reason for Exam:     Answer:   left L5 and S1 TFESI     Order Specific Question:   Anticoagulant hold needed?     Answer:   no       No orders of the defined types were placed in this encounter.      My impressions and treatment recommendations were discussed in detail with the patient who verbalized understanding and had no further questions.      This is a 33-year-old male who returns to our office after quite some time.  Last seen in 2022 for L4-5 lumbar epidural steroid injection with 50% relief of his lower back pain for 6 months.  This unfortunately did not help with his left posterior calf pain.      Reviewed his MRI report again and discussed that he likely has recurrent disc herniation at L5-S1 causing S1 impingement and posterior leg pain.  We discussed repeating epidural injection from a transforaminal approach at the left L5 and S1 levels in an effort to see if this helps more with his left lower extremity pain.    We did discuss addition of additional neuropathic agents such as Cymbalta, given failure to Lyrica and gabapentin.  However we will hold off on these until we see response to the injection.    He otherwise will continue management with [facility] orthopedics.  He is trying to avoid surgery.  I did discuss spinal cord stimulator as a potential treatment option.  He is not too keen on implantable procedures.       [place] Prescription Drug Monitoring Program report was reviewed and was appropriate     Complete risks and benefits including bleeding, infection, tissue reaction, nerve injury and allergic reaction were discussed. The approach was demonstrated using models  "and literature was provided. Verbal and written consent was obtained.      Discharge instructions were provided. I personally saw and examined the patient and I agree with the above discussed plan of care.    History of Present Illness:    Per Rogers Jr is a 33 y.o. male who presents to [facility] for interval re-evaluation of the above stated pain complaints. The patient has a past medical and chronic pain history as outlined in the assessment section. He was last seen on 8/23/2022 for left L4-5 LESI. Returns today with recurrent symptoms of lower back pain and cramping in the left calf.    Reports injection reduced back pain by 50% for 6 months. Calf pain did not improve at all. He still has calf pain with prolonged sitting    In review, patient has history of L5-S1 left hemilaminectomy..    Has MRI of the lumbar spine showing hemilaminectomy changes at L5-S1 with left paracentral and left foraminal disc protrusion with minimal spinal stenosis and moderate left foraminal narrowing.       Other than as stated above, the patient denies any interval changes in medications, medical condition, mental condition, symptoms, or allergies since the last office visit.         Review of Systems:    Review of Systems   Cardiovascular:  Positive for chest pain.   Musculoskeletal:  Positive for back pain and gait problem.        DROM  Muscle weakness  Joint stiffness         Past Medical History:   Diagnosis Date    Diarrhea     \"most of life\"       Past Surgical History:   Procedure Laterality Date    BACK SURGERY  2020    REPLACEMENT TOTAL KNEE  2019       History reviewed. No pertinent family history.    Social History     Occupational History    Not on file   Tobacco Use    Smoking status: Never    Smokeless tobacco: Never   Vaping Use    Vaping status: Never Used   Substance and Sexual Activity    Alcohol use: Not Currently    Drug use: Never    Sexual activity: Not on file         Current " Outpatient Medications:     albuterol (PROVENTIL HFA,VENTOLIN HFA) 90 mcg/act inhaler, Inhale 2 puffs every 6 (six) hours as needed prn, Disp: , Rfl:     famotidine (PEPCID) 20 mg tablet, Take 1 tablet (20 mg total) by mouth 2 (two) times a day, Disp: 60 tablet, Rfl: 3    Allergies   Allergen Reactions    Amoxicillin Rash     Break out       Physical Exam:    /88   Pulse 88   Wt 96.2 kg (212 lb)   BMI 31.31 kg/m²     Constitutional:normal, well developed, well nourished, alert, in no distress and non-toxic and no overt pain behavior.  Eyes:anicteric  HEENT:grossly intact  Neck:supple, symmetric, trachea midline and no masses   Pulmonary:even and unlabored  Cardiovascular:No edema or pitting edema present  Skin:Normal without rashes or lesions and well hydrated  Psychiatric:Mood and affect appropriate  Neurologic:Cranial Nerves II-XII grossly intact  Musculoskeletal:normal      Imaging  FL spine and pain procedure    (Results Pending)         Orders Placed This Encounter   Procedures    FL spine and pain procedure

## 2024-05-08 ENCOUNTER — PATIENT MESSAGE (OUTPATIENT)
Dept: RADIOLOGY | Facility: CLINIC | Age: 34
End: 2024-05-08

## 2024-05-08 ENCOUNTER — OFFICE VISIT (OUTPATIENT)
Dept: PAIN MEDICINE | Facility: CLINIC | Age: 34
End: 2024-05-08
Payer: COMMERCIAL

## 2024-05-08 VITALS
HEART RATE: 88 BPM | BODY MASS INDEX: 31.31 KG/M2 | SYSTOLIC BLOOD PRESSURE: 129 MMHG | WEIGHT: 212 LBS | DIASTOLIC BLOOD PRESSURE: 88 MMHG

## 2024-05-08 DIAGNOSIS — M96.1 LUMBAR POST-LAMINECTOMY SYNDROME: ICD-10-CM

## 2024-05-08 DIAGNOSIS — M54.16 LUMBAR RADICULOPATHY: Primary | ICD-10-CM

## 2024-05-08 DIAGNOSIS — G89.4 CHRONIC PAIN SYNDROME: ICD-10-CM

## 2024-05-08 PROCEDURE — 99214 OFFICE O/P EST MOD 30 MIN: CPT | Performed by: STUDENT IN AN ORGANIZED HEALTH CARE EDUCATION/TRAINING PROGRAM

## 2024-05-08 NOTE — PATIENT COMMUNICATION
Pt scheduled for TFESI with Dr Rodriguez on 6/6/24    Pt is not diabetic and reports he does not take prescription bloodthinners    Pt given instructions in office and via myc message    Have you completed PT/HEP/Chiro in the past 6 months for dedicated area?  If yes, how long did you complete?  What was the frequency?  Did it provide relief?  If no, reason therapy was not completed?   *PT not done within 6 mos, but was completed prior to that with [facility] -- pt reports PT did not provide relief

## 2024-06-06 ENCOUNTER — HOSPITAL ENCOUNTER (OUTPATIENT)
Dept: RADIOLOGY | Facility: CLINIC | Age: 34
Discharge: HOME | End: 2024-06-06
Admitting: STUDENT IN AN ORGANIZED HEALTH CARE EDUCATION/TRAINING PROGRAM
Payer: COMMERCIAL

## 2024-06-06 VITALS
SYSTOLIC BLOOD PRESSURE: 121 MMHG | OXYGEN SATURATION: 98 % | TEMPERATURE: 98.3 F | DIASTOLIC BLOOD PRESSURE: 76 MMHG | RESPIRATION RATE: 19 BRPM | HEART RATE: 81 BPM

## 2024-06-06 DIAGNOSIS — M54.16 LUMBAR RADICULOPATHY: ICD-10-CM

## 2024-06-06 PROCEDURE — 64484 NJX AA&/STRD TFRM EPI L/S EA: CPT | Performed by: STUDENT IN AN ORGANIZED HEALTH CARE EDUCATION/TRAINING PROGRAM

## 2024-06-06 PROCEDURE — 64483 NJX AA&/STRD TFRM EPI L/S 1: CPT | Performed by: STUDENT IN AN ORGANIZED HEALTH CARE EDUCATION/TRAINING PROGRAM

## 2024-06-06 RX ORDER — METHYLPREDNISOLONE ACETATE 80 MG/ML
80 INJECTION, SUSPENSION INTRA-ARTICULAR; INTRALESIONAL; INTRAMUSCULAR; PARENTERAL; SOFT TISSUE ONCE
Status: COMPLETED | OUTPATIENT
Start: 2024-06-06 | End: 2024-06-06

## 2024-06-06 RX ORDER — BUPIVACAINE HCL/PF 2.5 MG/ML
2 VIAL (ML) INJECTION ONCE
Status: COMPLETED | OUTPATIENT
Start: 2024-06-06 | End: 2024-06-06

## 2024-06-06 RX ADMIN — IOHEXOL 1 ML: 300 INJECTION, SOLUTION INTRAVENOUS at 15:34

## 2024-06-06 RX ADMIN — Medication 2 ML: at 15:34

## 2024-06-06 RX ADMIN — METHYLPREDNISOLONE ACETATE 80 MG: 80 INJECTION, SUSPENSION INTRA-ARTICULAR; INTRALESIONAL; INTRAMUSCULAR; PARENTERAL; SOFT TISSUE at 15:34

## 2024-06-06 NOTE — INTERVAL H&P NOTE
Update: (This section must be completed if the H&P was completed greater than 24 hrs to procedure or admission)    H&P reviewed. After examining the patient, I find no changed to the H&P since it had been written.    Patient re-evaluated. Accept as history and physical.    Derek Rodriguez MD/June 6, 2024/3:20 PM

## 2024-06-06 NOTE — DISCHARGE INSTR - LAB
Epidural Steroid Injection   WHAT YOU NEED TO KNOW:   An epidural steroid injection (CINDY) is a procedure to inject steroid medicine into the epidural space. The epidural space is between your spinal cord and vertebrae. Steroids reduce inflammation and fluid buildup in your spine that may be causing pain. You may be given pain medicine along with the steroids.          ACTIVITY  Do not drive or operate machinery today.  No strenuous activity today - bending, lifting, etc.  You may resume normal activites starting tomorrow - start slowly and as tolerated.  You may shower today, but no tub baths or hot tubs.  You may have numbness for several hours from the local anesthetic. Please use caution and common sense, especially with weight-bearing activities.    CARE OF THE INJECTION SITE  If you have soreness or pain, apply ice to the area today (20 minutes on/20 minutes off).  Starting tomorrow, you may use warm, moist heat or ice if needed.  You may have an increase or change in your discomfort for 36-48 hours after your treatment.  Apply ice and continue with any pain medication you have been prescribed.  Notify the [facility] if you have any of the following: redness, drainage, swelling, headache, stiff neck or fever above 100°F.    SPECIAL INSTRUCTIONS  Our office will contact you in approximately 14 days for a progress report.    MEDICATIONS  Continue to take all routine medications.  Our office may have instructed you to hold some medications.    As no general anesthesia was used in today's procedure, you should not experience any side effects related to anesthesia.     If you are diabetic, the steroids used in today's injection may temporarily increase your blood sugar levels after the first few days after your injection. Please keep a close eye on your sugars and alert the doctor who manages your diabetes if your sugars are significantly high from your baseline or you are symptomatic.     If you have a  problem specifically related to your procedure, please call our office at [phone].  Problems not related to your procedure should be directed to your primary care physician.

## 2025-01-13 ENCOUNTER — OFFICE VISIT (OUTPATIENT)
Dept: PAIN MEDICINE | Facility: CLINIC | Age: 35
End: 2025-01-13
Payer: COMMERCIAL

## 2025-01-13 ENCOUNTER — PATIENT MESSAGE (OUTPATIENT)
Dept: PAIN MEDICINE | Facility: CLINIC | Age: 35
End: 2025-01-13

## 2025-01-13 VITALS — WEIGHT: 212 LBS | HEIGHT: 69 IN | BODY MASS INDEX: 31.4 KG/M2

## 2025-01-13 DIAGNOSIS — M96.1 LUMBAR POST-LAMINECTOMY SYNDROME: ICD-10-CM

## 2025-01-13 DIAGNOSIS — M54.16 LUMBAR RADICULOPATHY: Primary | ICD-10-CM

## 2025-01-13 DIAGNOSIS — M47.816 LUMBAR FACET ARTHROPATHY: ICD-10-CM

## 2025-01-13 DIAGNOSIS — G89.4 CHRONIC PAIN SYNDROME: ICD-10-CM

## 2025-01-13 DIAGNOSIS — S86.812D STRAIN OF CALF MUSCLE, LEFT, SUBSEQUENT ENCOUNTER: ICD-10-CM

## 2025-01-13 PROCEDURE — 99214 OFFICE O/P EST MOD 30 MIN: CPT | Performed by: STUDENT IN AN ORGANIZED HEALTH CARE EDUCATION/TRAINING PROGRAM

## 2025-01-13 NOTE — PATIENT COMMUNICATION
Pt is scheduled for MBB with Dr Rodriguez on 1/30/25    Pt reports he is not diabetic and does not have a pacemaker or defibrillator    Pt given instructions in office and via myc message    Have you completed PT/HEP/Chiro in the past 6 months for dedicated area? Per chart, pt has had multiple injections for pain, and tries meds for pain relief (meds managed by [facility]) -- pt reported PT, with moderate relief, completed prior to his 2022 consult  If yes, how long did you complete?  What was the frequency?  Did it provide relief?  If no, reason therapy was not completed?

## 2025-01-13 NOTE — PROGRESS NOTES
Pain Medicine Follow-Up Note    Assessment:  1. Lumbar radiculopathy    2. Chronic pain syndrome    3. Lumbar post-laminectomy syndrome    4. Strain of calf muscle, left, subsequent encounter        Plan:  Orders Placed This Encounter   Procedures    Ambulatory referral to Orthopedic Surgery     Standing Status:   Future     Expiration Date:   1/13/2026     Referral Priority:   Routine     Referral Type:   Consult - AMB     Referral Reason:   Specialty Services Required     Referred to Provider:   Ye Moore DO     Requested Specialty:   Orthopedic Surgery     Number of Visits Requested:   1     Expiration Date:   1/13/2026       No orders of the defined types were placed in this encounter.      My impressions and treatment recommendations were discussed in detail with the patient who verbalized understanding and had no further questions.     34M returns to our office with ongoing bilateral lower back pain  and left calf pain. He has been seen for multiple epidural injections in the past.  Last injection provided 3 months of relief.  His symptoms appear to be mostly axial low back today as well as Haisley pain left calf.  Left calf pain is possibly due to calf strain and his lower back pain may be facet mediated.  We discussed bilateral L4-5 and L5-S1 medial branch block for diagnostic purposes.  If there is notable relief of the axial symptoms then he would be a candidate radiofrequency ablation.  However I do not think his left Symptoms are related to sciatica coming from the L5-S1 level as his pain is not continuous down the leg and he also has increased calf pain with dorsiflexion and plantarflexion of the left foot.      [place] Prescription Drug Monitoring Program report was reviewed and was appropriate     Complete risks and benefits including bleeding, infection, tissue reaction, nerve injury and allergic reaction were discussed. The approach was demonstrated using models and  "literature was provided. Verbal and written consent was obtained.    Discharge instructions were provided. I personally saw and examined the patient and I agree with the above discussed plan of care.    History of Present Illness:    Per Rogers Jr is a 34 y.o. male who presents to [facility] for interval re-evaluation of the above stated pain complaints. The patient has a past medical and chronic pain history as outlined in the assessment section. He was last seen on 6/6/2024 for left L5 and S1 TFESI with notable relief for 3 months.  Returns today with recurrent symptoms again including lower back pain and left calf pain.  Pain score 9 out of 10.  Morning, evening, nighttime are the worst.  Pain is constant, sharp, throbbing, cramping, pressure-like, shooting, numbness, pins-and-needles.    Since his last visit, he has continued to follow-up with [facility] orthopedics.    He had updated MRI on 11/29/2024 for both leg and lower back.  Leg MRI notable for minimal edema in the distal medial head of the gastrocnemius.  MRI lumbar spine noted for left lateral recess narrowing due to disc osteophyte complex at the L5-S1 level.  No significant change from previous MRI.    Other than as stated above, the patient denies any interval changes in medications, medical condition, mental condition, symptoms, or allergies since the last office visit.       He does note that the last injection helped with pain in the back but not in the calf. He reports pain in the calf with dynamic flexion/dorsiflexion in the left foot. He also denies continuous pain from the back down through the leg.     Review of Systems:    Review of Systems   Cardiovascular:  Positive for chest pain.   Musculoskeletal:  Positive for arthralgias, back pain and gait problem.   Neurological:  Positive for weakness.         Past Medical History:   Diagnosis Date    Diarrhea     \"most of life\"       Past Surgical History: " "  Procedure Laterality Date    BACK SURGERY  2020    REPLACEMENT TOTAL KNEE  2019       No family history on file.    Social History     Occupational History    Not on file   Tobacco Use    Smoking status: Never    Smokeless tobacco: Never   Vaping Use    Vaping status: Never Used   Substance and Sexual Activity    Alcohol use: Not Currently    Drug use: Never    Sexual activity: Not on file         Current Outpatient Medications:     albuterol (PROVENTIL HFA,VENTOLIN HFA) 90 mcg/act inhaler, Inhale 2 puffs every 6 (six) hours as needed prn, Disp: , Rfl:     famotidine (PEPCID) 20 mg tablet, Take 1 tablet (20 mg total) by mouth 2 (two) times a day, Disp: 60 tablet, Rfl: 3    Allergies   Allergen Reactions    Amoxicillin Rash     Break out       Physical Exam:    Ht 5' 9\" (1.753 m)   Wt 96.2 kg (212 lb)   BMI 31.31 kg/m²     Constitutional:normal, well developed, well nourished, alert, in no distress and non-toxic and no overt pain behavior.  Eyes:anicteric  HEENT:grossly intact  Neck:supple, symmetric, trachea midline and no masses   Pulmonary:even and unlabored  Cardiovascular:No edema or pitting edema present  Skin:Normal without rashes or lesions and well hydrated  Psychiatric:Mood and affect appropriate  Neurologic:Cranial Nerves II-XII grossly intact  Musculoskeletal: facet loading positive bilaterally      Imaging    MRI lumbar spine wo contrast       11/29/24  Order: [id number]  Impression    Impression:  There is no significant change compared to prior.      Postsurgical changes of left hemilaminectomy at L5-S1    Moderate to large disc osteophyte complex eccentric to the left at L5-S1.  Asymmetrically effaced left lateral recess with question of traversing left S1  nerve root impingement. Low adjacent low signal in the left lateral recess  possibly reflecting scar. A contrast-enhanced exam may be able to clarify,  assuming no contraindication.    Mild narrowing of the canal at L5-S1. Mild to moderate left " neural foraminal  stenosis. Question impingement of the exiting left L5 nerve root.    Narrative    History: Low back pain    Procedure: Multiplanar, multisequential MRI of the lumbar spine was performed  without contrast    Comparison: 7/14/22.    Findings: For the purposes of this dictation, the lumbar vertebrae are labeled  from a caudal to cranial direction, the first vertebra with lumbar morphology is  labeled as L5.    Postsurgical changes of left L5-S1 hemilaminectomy.    Alignment: Mild lumbar levocurvature    Marrow: Unremarkable    Vertebral Bodies: Unremarkable    Disk Spaces: There is disk degeneration and dessication at L5-S1    L1-L2: There is no spinal stenosis or foraminal narrowing.    L2-L3 : There is no spinal stenosis or foraminal narrowing.    L3-L4: There is no spinal stenosis or foraminal narrowing.    L4-L5: Mild disc bulging. No significant neural foraminal narrowing or spinal  canal stenosis.    L5-S1: Moderate to large disc osteophyte complex eccentric to the left.  Asymmetrically effaced left lateral recess with question of traversing left S1  nerve root impingement. Low adjacent low signal in the left lateral recess  possibly reflecting scar. Mild narrowing of the canal. Mild to moderate left  neural foraminal stenosis. Question impingement of the exiting left L5 nerve  root. No significant change.    Conus and lower thoracic cord: The conus is normal in position and signal  intensity.    No orders to display         Orders Placed This Encounter   Procedures    Ambulatory referral to Orthopedic Surgery

## 2025-01-13 NOTE — H&P (VIEW-ONLY)
Pain Medicine Follow-Up Note    Assessment:  1. Lumbar radiculopathy    2. Chronic pain syndrome    3. Lumbar post-laminectomy syndrome    4. Strain of calf muscle, left, subsequent encounter        Plan:  Orders Placed This Encounter   Procedures    Ambulatory referral to Orthopedic Surgery     Standing Status:   Future     Expiration Date:   1/13/2026     Referral Priority:   Routine     Referral Type:   Consult - AMB     Referral Reason:   Specialty Services Required     Referred to Provider:   Ye Moore DO     Requested Specialty:   Orthopedic Surgery     Number of Visits Requested:   1     Expiration Date:   1/13/2026       No orders of the defined types were placed in this encounter.      My impressions and treatment recommendations were discussed in detail with the patient who verbalized understanding and had no further questions.     34M returns to our office with ongoing bilateral lower back pain  and left calf pain. He has been seen for multiple epidural injections in the past.  Last injection provided 3 months of relief.  His symptoms appear to be mostly axial low back today as well as Haisley pain left calf.  Left calf pain is possibly due to calf strain and his lower back pain may be facet mediated.  We discussed bilateral L4-5 and L5-S1 medial branch block for diagnostic purposes.  If there is notable relief of the axial symptoms then he would be a candidate radiofrequency ablation.  However I do not think his left Symptoms are related to sciatica coming from the L5-S1 level as his pain is not continuous down the leg and he also has increased calf pain with dorsiflexion and plantarflexion of the left foot.      [place] Prescription Drug Monitoring Program report was reviewed and was appropriate     Complete risks and benefits including bleeding, infection, tissue reaction, nerve injury and allergic reaction were discussed. The approach was demonstrated using models and  "literature was provided. Verbal and written consent was obtained.    Discharge instructions were provided. I personally saw and examined the patient and I agree with the above discussed plan of care.    History of Present Illness:    Per Rogers Jr is a 34 y.o. male who presents to [facility] for interval re-evaluation of the above stated pain complaints. The patient has a past medical and chronic pain history as outlined in the assessment section. He was last seen on 6/6/2024 for left L5 and S1 TFESI with notable relief for 3 months.  Returns today with recurrent symptoms again including lower back pain and left calf pain.  Pain score 9 out of 10.  Morning, evening, nighttime are the worst.  Pain is constant, sharp, throbbing, cramping, pressure-like, shooting, numbness, pins-and-needles.    Since his last visit, he has continued to follow-up with [facility] orthopedics.    He had updated MRI on 11/29/2024 for both leg and lower back.  Leg MRI notable for minimal edema in the distal medial head of the gastrocnemius.  MRI lumbar spine noted for left lateral recess narrowing due to disc osteophyte complex at the L5-S1 level.  No significant change from previous MRI.    Other than as stated above, the patient denies any interval changes in medications, medical condition, mental condition, symptoms, or allergies since the last office visit.       He does note that the last injection helped with pain in the back but not in the calf. He reports pain in the calf with dynamic flexion/dorsiflexion in the left foot. He also denies continuous pain from the back down through the leg.     Review of Systems:    Review of Systems   Cardiovascular:  Positive for chest pain.   Musculoskeletal:  Positive for arthralgias, back pain and gait problem.   Neurological:  Positive for weakness.         Past Medical History:   Diagnosis Date    Diarrhea     \"most of life\"       Past Surgical History: " "  Procedure Laterality Date    BACK SURGERY  2020    REPLACEMENT TOTAL KNEE  2019       No family history on file.    Social History     Occupational History    Not on file   Tobacco Use    Smoking status: Never    Smokeless tobacco: Never   Vaping Use    Vaping status: Never Used   Substance and Sexual Activity    Alcohol use: Not Currently    Drug use: Never    Sexual activity: Not on file         Current Outpatient Medications:     albuterol (PROVENTIL HFA,VENTOLIN HFA) 90 mcg/act inhaler, Inhale 2 puffs every 6 (six) hours as needed prn, Disp: , Rfl:     famotidine (PEPCID) 20 mg tablet, Take 1 tablet (20 mg total) by mouth 2 (two) times a day, Disp: 60 tablet, Rfl: 3    Allergies   Allergen Reactions    Amoxicillin Rash     Break out       Physical Exam:    Ht 5' 9\" (1.753 m)   Wt 96.2 kg (212 lb)   BMI 31.31 kg/m²     Constitutional:normal, well developed, well nourished, alert, in no distress and non-toxic and no overt pain behavior.  Eyes:anicteric  HEENT:grossly intact  Neck:supple, symmetric, trachea midline and no masses   Pulmonary:even and unlabored  Cardiovascular:No edema or pitting edema present  Skin:Normal without rashes or lesions and well hydrated  Psychiatric:Mood and affect appropriate  Neurologic:Cranial Nerves II-XII grossly intact  Musculoskeletal: facet loading positive bilaterally      Imaging    MRI lumbar spine wo contrast       11/29/24  Order: [id number]  Impression    Impression:  There is no significant change compared to prior.      Postsurgical changes of left hemilaminectomy at L5-S1    Moderate to large disc osteophyte complex eccentric to the left at L5-S1.  Asymmetrically effaced left lateral recess with question of traversing left S1  nerve root impingement. Low adjacent low signal in the left lateral recess  possibly reflecting scar. A contrast-enhanced exam may be able to clarify,  assuming no contraindication.    Mild narrowing of the canal at L5-S1. Mild to moderate left " neural foraminal  stenosis. Question impingement of the exiting left L5 nerve root.    Narrative    History: Low back pain    Procedure: Multiplanar, multisequential MRI of the lumbar spine was performed  without contrast    Comparison: 7/14/22.    Findings: For the purposes of this dictation, the lumbar vertebrae are labeled  from a caudal to cranial direction, the first vertebra with lumbar morphology is  labeled as L5.    Postsurgical changes of left L5-S1 hemilaminectomy.    Alignment: Mild lumbar levocurvature    Marrow: Unremarkable    Vertebral Bodies: Unremarkable    Disk Spaces: There is disk degeneration and dessication at L5-S1    L1-L2: There is no spinal stenosis or foraminal narrowing.    L2-L3 : There is no spinal stenosis or foraminal narrowing.    L3-L4: There is no spinal stenosis or foraminal narrowing.    L4-L5: Mild disc bulging. No significant neural foraminal narrowing or spinal  canal stenosis.    L5-S1: Moderate to large disc osteophyte complex eccentric to the left.  Asymmetrically effaced left lateral recess with question of traversing left S1  nerve root impingement. Low adjacent low signal in the left lateral recess  possibly reflecting scar. Mild narrowing of the canal. Mild to moderate left  neural foraminal stenosis. Question impingement of the exiting left L5 nerve  root. No significant change.    Conus and lower thoracic cord: The conus is normal in position and signal  intensity.    No orders to display         Orders Placed This Encounter   Procedures    Ambulatory referral to Orthopedic Surgery

## 2025-01-30 ENCOUNTER — HOSPITAL ENCOUNTER (OUTPATIENT)
Dept: RADIOLOGY | Facility: CLINIC | Age: 35
End: 2025-01-30
Payer: COMMERCIAL

## 2025-01-30 VITALS
SYSTOLIC BLOOD PRESSURE: 121 MMHG | TEMPERATURE: 97.4 F | HEART RATE: 86 BPM | DIASTOLIC BLOOD PRESSURE: 82 MMHG | RESPIRATION RATE: 16 BRPM | OXYGEN SATURATION: 98 %

## 2025-01-30 DIAGNOSIS — M47.816 LUMBAR FACET ARTHROPATHY: ICD-10-CM

## 2025-01-30 PROCEDURE — 64493 INJ PARAVERT F JNT L/S 1 LEV: CPT | Performed by: STUDENT IN AN ORGANIZED HEALTH CARE EDUCATION/TRAINING PROGRAM

## 2025-01-30 PROCEDURE — 64494 INJ PARAVERT F JNT L/S 2 LEV: CPT | Performed by: STUDENT IN AN ORGANIZED HEALTH CARE EDUCATION/TRAINING PROGRAM

## 2025-01-30 RX ORDER — BUPIVACAINE HCL/PF 2.5 MG/ML
3 VIAL (ML) INJECTION ONCE
Status: COMPLETED | OUTPATIENT
Start: 2025-01-30 | End: 2025-01-30

## 2025-01-30 RX ADMIN — BUPIVACAINE HYDROCHLORIDE 3 ML: 2.5 INJECTION, SOLUTION EPIDURAL; INFILTRATION; INTRACAUDAL at 11:08

## 2025-01-30 NOTE — DISCHARGE INSTR - LAB

## 2025-01-30 NOTE — INTERVAL H&P NOTE
Update: (This section must be completed if the H&P was completed greater than 24 hrs to procedure or admission)    H&P reviewed. After examining the patient, I find no changed to the H&P since it had been written.    Patient re-evaluated. Accept as history and physical.    Derek Rodriguez MD/January 30, 2025/10:58 AM

## 2025-01-31 ENCOUNTER — PATIENT MESSAGE (OUTPATIENT)
Dept: PAIN MEDICINE | Facility: CLINIC | Age: 35
End: 2025-01-31

## 2025-01-31 DIAGNOSIS — M47.816 LUMBAR FACET ARTHROPATHY: Primary | ICD-10-CM

## 2025-01-31 NOTE — PATIENT COMMUNICATION
Caller: Patient    Doctor: Dr. Rodriguez    Reason for call: patient just uploaded his pain diary and  stated he feel worst after his procedure    Call back#:

## 2025-02-03 ENCOUNTER — OFFICE VISIT (OUTPATIENT)
Dept: OBGYN CLINIC | Facility: CLINIC | Age: 35
End: 2025-02-03
Payer: COMMERCIAL

## 2025-02-03 VITALS — WEIGHT: 202 LBS | BODY MASS INDEX: 29.92 KG/M2 | HEIGHT: 69 IN

## 2025-02-03 DIAGNOSIS — M48.061 LUMBAR FORAMINAL STENOSIS: ICD-10-CM

## 2025-02-03 DIAGNOSIS — S86.812A STRAIN OF CALF MUSCLE, LEFT, INITIAL ENCOUNTER: Primary | ICD-10-CM

## 2025-02-03 DIAGNOSIS — M54.16 RADICULOPATHY, LUMBAR REGION: ICD-10-CM

## 2025-02-03 PROCEDURE — 99203 OFFICE O/P NEW LOW 30 MIN: CPT | Performed by: FAMILY MEDICINE

## 2025-02-03 RX ORDER — DICLOFENAC SODIUM 75 MG/1
TABLET, DELAYED RELEASE ORAL
COMMUNITY
Start: 2024-12-09

## 2025-02-03 RX ORDER — GABAPENTIN 300 MG/1
CAPSULE ORAL
COMMUNITY
Start: 2024-12-09

## 2025-02-03 RX ORDER — LEVOCETIRIZINE DIHYDROCHLORIDE 5 MG/1
1 TABLET, FILM COATED ORAL EVERY EVENING
COMMUNITY

## 2025-02-03 RX ORDER — FLUTICASONE PROPIONATE 50 MCG
2 SPRAY, SUSPENSION (ML) NASAL DAILY
COMMUNITY
Start: 2025-01-17

## 2025-02-03 NOTE — LETTER
February 3, 2025     Derek Rodriguez MD  [address] [place] [address]    Patient: Per Rogers Jr   YOB: 1990   Date of Visit: 2/3/2025       Dear Dr. Rodriguez:    Thank you for referring Per Rogers Jr to me for evaluation. Below are my notes for this consultation.    If you have questions, please do not hesitate to call me. I look forward to following your patient along with you.         Sincerely,        Ye Moore DO        CC: No Recipients    Ye Moore DO  2/3/2025  5:54 PM  Sign when Signing Visit  Assessment/Plan:  Assessment & Plan  Diagnoses and all orders for this visit:    Strain of calf muscle, left, initial encounter  -     Ambulatory referral to Orthopedic Surgery  -     Ambulatory Referral to Physical Therapy; Future    Radiculopathy, lumbar region  -     Ambulatory Referral to Orthopedic Surgery; Future    Lumbar foraminal stenosis  -     Ambulatory Referral to Orthopedic Surgery; Future    Other orders  -     diclofenac (VOLTAREN) 75 mg EC tablet; Take 1 tablet twice a day by oral route. (Patient not taking: Reported on 2/3/2025)  -     levocetirizine (XYZAL) 5 MG tablet; Take 1 tablet by mouth every evening (Patient not taking: Reported on 2/3/2025)  -     gabapentin (NEURONTIN) 300 mg capsule; take 1 tablet by mouth twice a day with dinner and at bedtime (Patient not taking: Reported on 2/3/2025)  -     fluticasone (FLONASE) 50 mcg/act nasal spray; 2 sprays into each nostril daily (Patient not taking: Reported on 2/3/2025)        34-year-old male with left lower leg pain more than 5 years duration.  Discussed with patient clinical exam, imaging studies, impression, and plan.  MRI of the left tib-fib noted for edema into distal medial head gastrocnemius, suggestive of grade 1 strain.  MRI lumbar spine noted for postsurgical changes of left Patrick ligament L5-S1, moderate to large disc osteophyte complex eccentric to the  left at L5-S1 asymmetrically effacing the left lateral recess with question of traversing left S1 nerve root impingement.  Imaging studies, clinical exam, and history suggest that symptoms may be due to lumbar spine pathology contribute to radicular symptoms and from strain of the calf.  I will refer him to physical therapy which he is to start as soon as possible and do home exercises as directed.  Symptoms have not improved with formal therapy and diagnostic medial branch blocks so I will refer him to spine surgeon for further evaluation and recommendation treatment.  He is to start taking turmeric, tart cherry, and glucosamine supplements.  He will follow-up with me as needed.        Subjective:   Patient ID: Per Rogers Jr is a 34 y.o. male.  Chief Complaint   Patient presents with   • Left Lower Leg - Pain        34-year-old male presents evaluation left lower leg pain more than 5 years duration.  Denies particular trauma.  Reports onset of symptoms after undergoing lumbar laminectomy.  He is status post laminectomy with Dr. Pickens in 2019.  He has been experiencing pain described localized to left posterior proximal calf, throbbing and burning and squeezing, worse with plantarflexion of the ankle, and improved with resting.  He states that when he is standing/ambulatory he has little discomfort, but he is able to reproduce intense left lower leg pain with prolonged plantarflexion.  He was seen by Dr. Rodriguez and underwent epidural injection.  He had follow-up with Dr. Pickens who and MRI of the lumbar spine and left tib-fib done.  Patient states that due to change in insurance he has been unable to follow-up with Dr. Pickens.  He had follow-up with Dr. Rodriguez.  He was recommended to see sports medicine regarding left lower leg pain and he underwent diagnostic medial branch block.  He reports worsening of symptoms following procedure.        Leg Pain  This is a chronic problem. The current episode  "started more than 1 year ago. The problem occurs daily. The problem has been waxing and waning. Pertinent negatives include no arthralgias, joint swelling, numbness or weakness. Exacerbated by: Ankle plantarflexion. He has tried rest and position changes for the symptoms. The treatment provided mild relief.           The following portions of the patient's history were reviewed and updated as appropriate: He  has a past medical history of Diarrhea.  He is allergic to amoxicillin..    Review of Systems   Musculoskeletal:  Negative for arthralgias and joint swelling.   Neurological:  Negative for weakness and numbness.       Objective:  Vitals:    02/03/25 1358   Weight: 91.6 kg (202 lb)   Height: 5' 9\" (1.753 m)      Left Ankle Exam     Tenderness   The patient is experiencing no tenderness.   Swelling: none    Range of Motion   Dorsiflexion:  normal   Plantar flexion:  normal     Muscle Strength   Dorsiflexion:  5/5   Plantar flexion:  5/5     Other   Sensation: normal  Pulse: present      Left Knee Exam     Tenderness   Left knee tenderness location: Mild tenderness proximal posterior calf/gastrocnemius.    Range of Motion   Extension:  normal   Flexion:  normal           Strength/Myotome Testing     Left Ankle/Foot   Dorsiflexion: 5  Plantar flexion: 5      Physical Exam  Vitals and nursing note reviewed.   Constitutional:       Appearance: Normal appearance. He is well-developed. He is not ill-appearing or diaphoretic.   HENT:      Head: Normocephalic and atraumatic.      Right Ear: External ear normal.      Left Ear: External ear normal.   Eyes:      Conjunctiva/sclera: Conjunctivae normal.   Neck:      Trachea: No tracheal deviation.   Pulmonary:      Effort: Pulmonary effort is normal. No respiratory distress.   Abdominal:      General: There is no distension.   Musculoskeletal:         General: Tenderness present.   Skin:     General: Skin is warm and dry.      Coloration: Skin is not jaundiced or pale. "   Neurological:      Mental Status: He is alert and oriented to person, place, and time.   Psychiatric:         Mood and Affect: Mood normal.         Behavior: Behavior normal.         Thought Content: Thought content normal.         Judgment: Judgment normal.

## 2025-02-03 NOTE — PATIENT INSTRUCTIONS
Over the counter vitamins:    - Turmeric vitamin at least 1000 mg daily    - Tart cherry vitamin at least 1000 mg daily    - Glucosamine-chondrointin 2-3 times daily

## 2025-02-03 NOTE — PROGRESS NOTES
Assessment/Plan:  Assessment & Plan   Diagnoses and all orders for this visit:    Strain of calf muscle, left, initial encounter  -     Ambulatory referral to Orthopedic Surgery  -     Ambulatory Referral to Physical Therapy; Future    Radiculopathy, lumbar region  -     Ambulatory Referral to Orthopedic Surgery; Future    Lumbar foraminal stenosis  -     Ambulatory Referral to Orthopedic Surgery; Future    Other orders  -     diclofenac (VOLTAREN) 75 mg EC tablet; Take 1 tablet twice a day by oral route. (Patient not taking: Reported on 2/3/2025)  -     levocetirizine (XYZAL) 5 MG tablet; Take 1 tablet by mouth every evening (Patient not taking: Reported on 2/3/2025)  -     gabapentin (NEURONTIN) 300 mg capsule; take 1 tablet by mouth twice a day with dinner and at bedtime (Patient not taking: Reported on 2/3/2025)  -     fluticasone (FLONASE) 50 mcg/act nasal spray; 2 sprays into each nostril daily (Patient not taking: Reported on 2/3/2025)        34-year-old male with left lower leg pain more than 5 years duration.  Discussed with patient clinical exam, imaging studies, impression, and plan.  MRI of the left tib-fib noted for edema into distal medial head gastrocnemius, suggestive of grade 1 strain.  MRI lumbar spine noted for postsurgical changes of left Patrick ligament L5-S1, moderate to large disc osteophyte complex eccentric to the left at L5-S1 asymmetrically effacing the left lateral recess with question of traversing left S1 nerve root impingement.  Imaging studies, clinical exam, and history suggest that symptoms may be due to lumbar spine pathology contribute to radicular symptoms and from strain of the calf.  I will refer him to physical therapy which he is to start as soon as possible and do home exercises as directed.  Symptoms have not improved with formal therapy and diagnostic medial branch blocks so I will refer him to spine surgeon for further evaluation and recommendation treatment.  He is to  start taking turmeric, tart cherry, and glucosamine supplements.  He will follow-up with me as needed.        Subjective:   Patient ID: Per Rogers Jr is a 34 y.o. male.  Chief Complaint   Patient presents with    Left Lower Leg - Pain        34-year-old male presents evaluation left lower leg pain more than 5 years duration.  Denies particular trauma.  Reports onset of symptoms after undergoing lumbar laminectomy.  He is status post laminectomy with Dr. Pickens in 2019.  He has been experiencing pain described localized to left posterior proximal calf, throbbing and burning and squeezing, worse with plantarflexion of the ankle, and improved with resting.  He states that when he is standing/ambulatory he has little discomfort, but he is able to reproduce intense left lower leg pain with prolonged plantarflexion.  He was seen by Dr. Rodriguez and underwent epidural injection.  He had follow-up with Dr. Pickens who and MRI of the lumbar spine and left tib-fib done.  Patient states that due to change in insurance he has been unable to follow-up with Dr. Pickens.  He had follow-up with Dr. Rodriguez.  He was recommended to see sports medicine regarding left lower leg pain and he underwent diagnostic medial branch block.  He reports worsening of symptoms following procedure.        Leg Pain  This is a chronic problem. The current episode started more than 1 year ago. The problem occurs daily. The problem has been waxing and waning. Pertinent negatives include no arthralgias, joint swelling, numbness or weakness. Exacerbated by: Ankle plantarflexion. He has tried rest and position changes for the symptoms. The treatment provided mild relief.           The following portions of the patient's history were reviewed and updated as appropriate: He  has a past medical history of Diarrhea.  He is allergic to amoxicillin..    Review of Systems   Musculoskeletal:  Negative for arthralgias and joint swelling.   Neurological:   "Negative for weakness and numbness.       Objective:  Vitals:    02/03/25 1358   Weight: 91.6 kg (202 lb)   Height: 5' 9\" (1.753 m)      Left Ankle Exam     Tenderness   The patient is experiencing no tenderness.   Swelling: none    Range of Motion   Dorsiflexion:  normal   Plantar flexion:  normal     Muscle Strength   Dorsiflexion:  5/5   Plantar flexion:  5/5     Other   Sensation: normal  Pulse: present      Left Knee Exam     Tenderness   Left knee tenderness location: Mild tenderness proximal posterior calf/gastrocnemius.    Range of Motion   Extension:  normal   Flexion:  normal           Strength/Myotome Testing     Left Ankle/Foot   Dorsiflexion: 5  Plantar flexion: 5      Physical Exam  Vitals and nursing note reviewed.   Constitutional:       Appearance: Normal appearance. He is well-developed. He is not ill-appearing or diaphoretic.   HENT:      Head: Normocephalic and atraumatic.      Right Ear: External ear normal.      Left Ear: External ear normal.   Eyes:      Conjunctiva/sclera: Conjunctivae normal.   Neck:      Trachea: No tracheal deviation.   Pulmonary:      Effort: Pulmonary effort is normal. No respiratory distress.   Abdominal:      General: There is no distension.   Musculoskeletal:         General: Tenderness present.   Skin:     General: Skin is warm and dry.      Coloration: Skin is not jaundiced or pale.   Neurological:      Mental Status: He is alert and oriented to person, place, and time.   Psychiatric:         Mood and Affect: Mood normal.         Behavior: Behavior normal.         Thought Content: Thought content normal.         Judgment: Judgment normal.                   "

## 2025-02-03 NOTE — PATIENT COMMUNICATION
Pt is requesting call from clinical team regarding the pain he experienced after the first procedure in more detail before he scheduled the second procedure.

## 2025-02-12 ENCOUNTER — HOSPITAL ENCOUNTER (OUTPATIENT)
Dept: RADIOLOGY | Facility: HOSPITAL | Age: 35
Discharge: HOME | End: 2025-02-12
Attending: ORTHOPAEDIC SURGERY
Payer: COMMERCIAL

## 2025-02-12 ENCOUNTER — OFFICE VISIT (OUTPATIENT)
Dept: OBGYN CLINIC | Facility: HOSPITAL | Age: 35
End: 2025-02-12
Payer: COMMERCIAL

## 2025-02-12 VITALS — HEIGHT: 69 IN | BODY MASS INDEX: 29.91 KG/M2 | WEIGHT: 201.94 LBS

## 2025-02-12 DIAGNOSIS — R52 PAIN: ICD-10-CM

## 2025-02-12 DIAGNOSIS — M54.16 RADICULOPATHY, LUMBAR REGION: ICD-10-CM

## 2025-02-12 DIAGNOSIS — M48.061 LUMBAR FORAMINAL STENOSIS: ICD-10-CM

## 2025-02-12 DIAGNOSIS — M54.6 THORACIC BACK PAIN, UNSPECIFIED BACK PAIN LATERALITY, UNSPECIFIED CHRONICITY: ICD-10-CM

## 2025-02-12 DIAGNOSIS — R52 PAIN: Primary | ICD-10-CM

## 2025-02-12 PROCEDURE — 99214 OFFICE O/P EST MOD 30 MIN: CPT | Performed by: ORTHOPAEDIC SURGERY

## 2025-02-12 PROCEDURE — 72110 X-RAY EXAM L-2 SPINE 4/>VWS: CPT

## 2025-02-12 NOTE — LETTER
2025     Derek Rodriguez MD  [address] [place] [address]    Patient: Per Rogers Jr   YOB: 1990   Date of Visit: 2025       Dear Dr. Rodriguez:    Thank you for referring Per Rogers Jr to me for evaluation. Below are my notes for this consultation.    If you have questions, please do not hesitate to call me. I look forward to following your patient along with you.         Sincerely,        Jace Arora MD        CC: No Recipients    Jace Arora MD  2025  2:21 PM  Sign when Signing Visit  Assessment & Plan/Medical Decision Makin y.o. male with Back and Left Lower Extremity Pain and imaging findings most notable for Lumbar Spondylosis        The clinical, physical and imaging findings were reviewed with the patient.  Per  has a constellation of findings consistent with lumbar degenerative disc disease.    Physical exam showing TTP thoracic region, decreased lumbar ROM.  Fortunately patient remains neurologically intact and functional.   We discussed the treatment options including physical therapy, at home exercises, activity modifications, chiropractic medicine, oral medications, interventional spine procedures.  At this time recommend continued conservative treatments.    Will plan to obtain MRI thoracic and lumbar spine to further evaluate patient's symptoms. Will obtain lumbar MRI w wo contrast. Will review MRI in detail with patient at follow-up visit and discuss further treatment options at that time.    Patient instructed to return to office/ER sooner if symptoms are not improving, getting worse, or new worrisome/neurologic symptoms arise.  Patient will follow up after MRI. Can call patient with results.     Subjective:      Chief Complaint: Back Pain    HPI:  Per Rogers Jr is a 34 y.o. male presenting for initial visit with chief complaint of back pain. PMH left L5-S1 hemilaminectomy in 2019. Had improvement in back and  "left leg pain after surgery and become overall more mobile, however has had persistent left calf pain and muscle cramps that started a few weeks after surgery if he plantar flexes his left ankle. Also with ongoing mid/low back pain. Notes his left leg will \"fall asleep\" after sitting for more than 5 minutes or so. Reports ongoing mid/low back pain with radiation of pain and numbness/tingling into his left lower extremity, primarily in his left calf region. Intermittent left thigh pain, not constant. Subjective left lower extremity weakness, feels like his left leg will give out on him at times. Worsening back pain pain after sitting or standing for >1 hour. Increased pain with certain movements. Denies right sided symptoms. Denies any jose trauma. Denies fever or chills, no night sweats. Denies any bladder or bowel changes.      Denies heart or lung disease. Denies diabetes or kidney disease.    Conservative therapy includes the following:   Medications: gabapentin, voltaren, flexeril without relief    Injections:   1/30/2025 - L4-5 and L5-S1 MBB - no relief  6/6/2024 - left L5, S1 TFESI - a few months of back pain relief  8/23/2022 -  left L4-5 ILESI - a few months of back pain relief  Physical Therapy: denies recent, has upcoming PT for his left calf  Chiropractic Medicine: has not attempted  Accupunture/Massage Therapy: has not attempted   These therapeutic modalities were ineffective at providing sustained pain relief/functional improvement.     Nicotine dependent: denies  Occupation: disability  Living situation: Lives with family   ADLs: patient is able to perform     Objective:     History reviewed. No pertinent family history.    Past Medical History:   Diagnosis Date   • Diarrhea     \"most of life\"       Current Outpatient Medications   Medication Sig Dispense Refill   • diclofenac (VOLTAREN) 75 mg EC tablet Take 1 tablet twice a day by oral route. (Patient not taking: Reported on 2/3/2025)     • " fluticasone (FLONASE) 50 mcg/act nasal spray 2 sprays into each nostril daily (Patient not taking: Reported on 2/3/2025)     • gabapentin (NEURONTIN) 300 mg capsule take 1 tablet by mouth twice a day with dinner and at bedtime (Patient not taking: Reported on 2/3/2025)     • levocetirizine (XYZAL) 5 MG tablet Take 1 tablet by mouth every evening (Patient not taking: Reported on 2/3/2025)       No current facility-administered medications for this visit.       Past Surgical History:   Procedure Laterality Date   • BACK SURGERY  2020   • REPLACEMENT TOTAL KNEE  2019       Social History     Socioeconomic History   • Marital status: Single     Spouse name: Not on file   • Number of children: Not on file   • Years of education: Not on file   • Highest education level: Not on file   Occupational History   • Not on file   Tobacco Use   • Smoking status: Never   • Smokeless tobacco: Never   Vaping Use   • Vaping status: Never Used   Substance and Sexual Activity   • Alcohol use: Not Currently   • Drug use: Never   • Sexual activity: Not on file   Other Topics Concern   • Not on file   Social History Narrative   • Not on file     Social Drivers of Health     Financial Resource Strain: High Risk (1/14/2025)    Received from [facility]    Overall Financial Resource Strain (CARDIA)    • Difficulty of Paying Living Expenses: Hard   Food Insecurity: No Food Insecurity (1/14/2025)    Received from [facility]    Hunger Vital Sign    • Worried About Running Out of Food in the Last Year: Never true    • Ran Out of Food in the Last Year: Never true   Transportation Needs: No Transportation Needs (1/14/2025)    [facility] from [facility]    [facility]    • Lack of Transportation ([facility]): No    • Lack of Transportation (Non-Medical): No   Physical Activity: Not on file   Stress: Not on file   Social Connections: Feeling Socially Integrated (1/14/2025)    Received from  "[facility]    OASIS : Social Isolation    • How often do you feel lonely or isolated from those around you?: Rarely   Intimate Partner Violence: Not At Risk (1/14/2025)    Received from [facility]    Humiliation, Afraid, Rape, and Kick questionnaire    • Fear of Current or Ex-Partner: No    • Emotionally Abused: No    • Physically Abused: No    • Sexually Abused: No   Housing Stability: Low Risk  (1/14/2025)    Received from [facility]    Housing Stability Vital Sign    • Unable to Pay for Housing in the Last Year: No    • Number of Times Moved in the Last Year: 1    • Homeless in the Last Year: No       Allergies   Allergen Reactions   • Amoxicillin Swelling and Rash     Break out       Review of Systems  General- denies fever/chills  HEENT- denies hearing loss or sore throat  Eyes- denies eye pain or visual disturbances, denies red eyes  Respiratory- denies cough or SOB  Cardio- denies chest pain or palpitations  GI- denies abdominal pain  Endocrine- denies urinary frequency  Urinary- denies pain with urination  Musculoskeletal- Negative except noted above  Skin- denies rashes or wounds  Neurological- denies dizziness or headache  Psychiatric- denies anxiety or difficulty concentrating    Physical Exam  Ht 5' 9\" (1.753 m)   Wt 91.6 kg (201 lb 15.1 oz)   BMI 29.82 kg/m²     General/Constitutional: No apparent distress: well-nourished and well developed.  Lymphatic: No appreciable lymphadenopathy  Respiratory: Non-labored breathing  Vascular: No edema, swelling or tenderness, except as noted in detailed exam.  Integumentary: No impressive skin lesions present, except as noted in detailed exam.  Psych: Normal mood and affect, oriented to person, place and time.  MSK: normal other than stated in HPI and exam  Gait & balance: no evidence of myelopathic gait, ambulates Independently     Lumbar spine range of motion:  -Forward flexion to 70  -Extension to " "neutral  There is mild tenderness with palpation along lumbar paraspinal musculature, no midline tenderness     Neurologic:  Lower Extremity Motor Function    Right  Left    Iliopsoas  5/5  5/5    Quadriceps 5/5 5/5   Tibialis anterior  5/5  5/5    EHL  5/5  5/5    Gastroc. muscle  5/5  5/5    Heel rise  5/5  5/5    Toe rise  5/5  5/5      Sensory: light touch is intact to bilateral lower extremities     Reflexes:  Intact, diminished     Other tests:  Straight Leg Raise: negative  Keshia SI: negative  MAYRA SI: negative  Greater troch: no tenderness   Internal/external hip ROM: intact, no pain   Flexion/extension knee ROM: intact, no pain   Vascular: WWP extremities    Diagnostic Tests   IMAGING: I have personally reviewed the images and these are my findings:  Lumbar Spine X-rays from 2/12/2025: multi level lumbar spondylosis with loss of disc height, osteophyte formation and facet hypertrophy, no apparent spondylolisthesis, no appreciated lytic/blastic lesions, no obvious instability    Lumbar Spine MRI from 11/29/2024: lumbar disc degeneration with disc desiccation notably L5-S1, loss of disc height, left paracentral disc protrusion L5-S1    Electronic Medical Records were reviewed including office notes, imaging studies, radiology reports    Procedures, if performed today     None performed       Portions of the record may have been created with voice recognition software.  Occasional wrong word or \"sound a like\" substitutions may have occurred due to the inherent limitations of voice recognition software.  Read the chart carefully and recognize, using context, where substitutions have occurred.  "

## 2025-02-12 NOTE — PROGRESS NOTES
"Assessment & Plan/Medical Decision Makin y.o. male with Back and Left Lower Extremity Pain and imaging findings most notable for Lumbar Spondylosis        The clinical, physical and imaging findings were reviewed with the patient.  Per  has a constellation of findings consistent with lumbar degenerative disc disease.    Physical exam showing TTP thoracic region, decreased lumbar ROM.  Fortunately patient remains neurologically intact and functional.   We discussed the treatment options including physical therapy, at home exercises, activity modifications, chiropractic medicine, oral medications, interventional spine procedures.  At this time recommend continued conservative treatments.    Will plan to obtain MRI thoracic and lumbar spine to further evaluate patient's symptoms. Will obtain lumbar MRI w wo contrast. Will review MRI in detail with patient at follow-up visit and discuss further treatment options at that time.    Patient instructed to return to office/ER sooner if symptoms are not improving, getting worse, or new worrisome/neurologic symptoms arise.  Patient will follow up after MRI. Can call patient with results.     Subjective:      Chief Complaint: Back Pain    HPI:  Per Rogers Jr is a 34 y.o. male presenting for initial visit with chief complaint of back pain. PMH left L5-S1 hemilaminectomy in 2019. Had improvement in back and left leg pain after surgery and become overall more mobile, however has had persistent left calf pain and muscle cramps that started a few weeks after surgery if he plantar flexes his left ankle. Also with ongoing mid/low back pain. Notes his left leg will \"fall asleep\" after sitting for more than 5 minutes or so. Reports ongoing mid/low back pain with radiation of pain and numbness/tingling into his left lower extremity, primarily in his left calf region. Intermittent left thigh pain, not constant. Subjective left lower extremity weakness, feels like his left " "leg will give out on him at times. Worsening back pain pain after sitting or standing for >1 hour. Increased pain with certain movements. Denies right sided symptoms. Denies any jose trauma. Denies fever or chills, no night sweats. Denies any bladder or bowel changes.      Denies heart or lung disease. Denies diabetes or kidney disease.    Conservative therapy includes the following:   Medications: gabapentin, voltaren, flexeril without relief    Injections:   1/30/2025 - L4-5 and L5-S1 MBB - no relief  6/6/2024 - left L5, S1 TFESI - a few months of back pain relief  8/23/2022 -  left L4-5 ILESI - a few months of back pain relief  Physical Therapy: denies recent, has upcoming PT for his left calf  Chiropractic Medicine: has not attempted  Accupunture/Massage Therapy: has not attempted   These therapeutic modalities were ineffective at providing sustained pain relief/functional improvement.     Nicotine dependent: denies  Occupation: disability  Living situation: Lives with family   ADLs: patient is able to perform     Objective:     History reviewed. No pertinent family history.    Past Medical History:   Diagnosis Date    Diarrhea     \"most of life\"       Current Outpatient Medications   Medication Sig Dispense Refill    diclofenac (VOLTAREN) 75 mg EC tablet Take 1 tablet twice a day by oral route. (Patient not taking: Reported on 2/3/2025)      fluticasone (FLONASE) 50 mcg/act nasal spray 2 sprays into each nostril daily (Patient not taking: Reported on 2/3/2025)      gabapentin (NEURONTIN) 300 mg capsule take 1 tablet by mouth twice a day with dinner and at bedtime (Patient not taking: Reported on 2/3/2025)      levocetirizine (XYZAL) 5 MG tablet Take 1 tablet by mouth every evening (Patient not taking: Reported on 2/3/2025)       No current facility-administered medications for this visit.       Past Surgical History:   Procedure Laterality Date    BACK SURGERY  2020    REPLACEMENT TOTAL KNEE  2019       Social " History     Socioeconomic History    Marital status: Single     Spouse name: Not on file    Number of children: Not on file    Years of education: Not on file    Highest education level: Not on file   Occupational History    Not on file   Tobacco Use    Smoking status: Never    Smokeless tobacco: Never   Vaping Use    Vaping status: Never Used   Substance and Sexual Activity    Alcohol use: Not Currently    Drug use: Never    Sexual activity: Not on file   Other Topics Concern    Not on file   Social History Narrative    Not on file     Social Drivers of Health     Financial Resource Strain: High Risk (1/14/2025)    Received from [facility]    Overall Financial Resource Strain (CARDIA)     Difficulty of Paying Living Expenses: Hard   Food Insecurity: No Food Insecurity (1/14/2025)    Received from [facility]    Hunger Vital Sign     Worried About Running Out of Food in the Last Year: Never true     Ran Out of Food in the Last Year: Never true   Transportation Needs: No Transportation Needs (1/14/2025)    Received from [facility]    PRAPARE - Transportation     Lack of Transportation (Medical): No     Lack of Transportation (Non-Medical): No   Physical Activity: Not on file   Stress: Not on file   Social Connections: Feeling Socially Integrated (1/14/2025)    Received from [facility]    OASIS : Social Isolation     How often do you feel lonely or isolated from those around you?: Rarely   Intimate Partner Violence: Not At Risk (1/14/2025)    Received from [facility]    Humiliation, Afraid, Rape, and Kick questionnaire     Fear of Current or Ex-Partner: No     Emotionally Abused: No     Physically Abused: No     Sexually Abused: No   Housing Stability: Low Risk  (1/14/2025)    Received from [facility]    Housing Stability Vital Sign     Unable to Pay for Housing in the Last Year: No     Number of Times Moved  "in the Last Year: 1     Homeless in the Last Year: No       Allergies   Allergen Reactions    Amoxicillin Swelling and Rash     Break out       Review of Systems  General- denies fever/chills  HEENT- denies hearing loss or sore throat  Eyes- denies eye pain or visual disturbances, denies red eyes  Respiratory- denies cough or SOB  Cardio- denies chest pain or palpitations  GI- denies abdominal pain  Endocrine- denies urinary frequency  Urinary- denies pain with urination  Musculoskeletal- Negative except noted above  Skin- denies rashes or wounds  Neurological- denies dizziness or headache  Psychiatric- denies anxiety or difficulty concentrating    Physical Exam  Ht 5' 9\" (1.753 m)   Wt 91.6 kg (201 lb 15.1 oz)   BMI 29.82 kg/m²     General/Constitutional: No apparent distress: well-nourished and well developed.  Lymphatic: No appreciable lymphadenopathy  Respiratory: Non-labored breathing  Vascular: No edema, swelling or tenderness, except as noted in detailed exam.  Integumentary: No impressive skin lesions present, except as noted in detailed exam.  Psych: Normal mood and affect, oriented to person, place and time.  MSK: normal other than stated in HPI and exam  Gait & balance: no evidence of myelopathic gait, ambulates Independently     Lumbar spine range of motion:  -Forward flexion to 70  -Extension to neutral  There is mild tenderness with palpation along lumbar paraspinal musculature, no midline tenderness     Neurologic:  Lower Extremity Motor Function    Right  Left    Iliopsoas  5/5  5/5    Quadriceps 5/5 5/5   Tibialis anterior  5/5  5/5    EHL  5/5  5/5    Gastroc. muscle  5/5  5/5    Heel rise  5/5  5/5    Toe rise  5/5  5/5      Sensory: light touch is intact to bilateral lower extremities     Reflexes:  Intact, diminished     Other tests:  Straight Leg Raise: negative  Keshia SI: negative  MAYRA SI: negative  Greater troch: no tenderness   Internal/external hip ROM: intact, no pain " "  Flexion/extension knee ROM: intact, no pain   Vascular: WWP extremities    Diagnostic Tests   IMAGING: I have personally reviewed the images and these are my findings:  Lumbar Spine X-rays from 2/12/2025: multi level lumbar spondylosis with loss of disc height, osteophyte formation and facet hypertrophy, no apparent spondylolisthesis, no appreciated lytic/blastic lesions, no obvious instability    Lumbar Spine MRI from 11/29/2024: lumbar disc degeneration with disc desiccation notably L5-S1, loss of disc height, left paracentral disc protrusion L5-S1    Electronic Medical Records were reviewed including office notes, imaging studies, radiology reports    Procedures, if performed today     None performed       Portions of the record may have been created with voice recognition software.  Occasional wrong word or \"sound a like\" substitutions may have occurred due to the inherent limitations of voice recognition software.  Read the chart carefully and recognize, using context, where substitutions have occurred.  "

## 2025-03-06 ENCOUNTER — HOSPITAL ENCOUNTER (OUTPATIENT)
Dept: MRI IMAGING | Facility: HOSPITAL | Age: 35
End: 2025-03-06
Payer: COMMERCIAL

## 2025-03-06 DIAGNOSIS — M54.6 THORACIC BACK PAIN, UNSPECIFIED BACK PAIN LATERALITY, UNSPECIFIED CHRONICITY: ICD-10-CM

## 2025-03-06 DIAGNOSIS — M54.16 RADICULOPATHY, LUMBAR REGION: ICD-10-CM

## 2025-03-06 PROCEDURE — 72158 MRI LUMBAR SPINE W/O & W/DYE: CPT

## 2025-03-06 PROCEDURE — A9585 GADOBUTROL INJECTION: HCPCS

## 2025-03-06 PROCEDURE — 72146 MRI CHEST SPINE W/O DYE: CPT

## 2025-03-06 RX ORDER — GADOBUTROL 604.72 MG/ML
9 INJECTION INTRAVENOUS
Status: COMPLETED | OUTPATIENT
Start: 2025-03-06 | End: 2025-03-06

## 2025-03-06 RX ADMIN — GADOBUTROL 9 ML: 604.72 INJECTION INTRAVENOUS at 22:07

## 2025-03-07 ENCOUNTER — TELEPHONE (OUTPATIENT)
Dept: OBGYN CLINIC | Facility: HOSPITAL | Age: 35
End: 2025-03-07

## 2025-03-07 NOTE — TELEPHONE ENCOUNTER
Caller: pt     Doctor: Dr. Arora     Reason for call: pt is requesting a call back in regards to MRI results. Per last OVN: Patient will follow up after MRI. Can call patient with results.     Call back#: Phone:   [phone]

## 2025-03-10 NOTE — TELEPHONE ENCOUNTER
Caller: Patient     Doctor: Dr. Arora    Reason for call: Patient called back requested a call to discuss MRI results. Please advise.    Call back#: [phone]

## 2025-03-14 NOTE — TELEPHONE ENCOUNTER
Patient calling back in regard to results for his MRI of lumbar spine.  He can be reached at the number below. OVN states Patient's MRI can be discussed over the phone.     Call back: [phone]

## 2025-03-18 NOTE — TELEPHONE ENCOUNTER
Caller: Patient     Doctor: Dr. Arora     Reason for call: Patient calling back again to discuss MRI results. He had the MRI on 3/6 and is still waiting to discuss results  Please call patient ASAP    Call back#: [phone]

## 2025-03-21 DIAGNOSIS — M54.16 RADICULOPATHY, LUMBAR REGION: Primary | ICD-10-CM

## 2025-03-21 DIAGNOSIS — M48.061 LUMBAR FORAMINAL STENOSIS: ICD-10-CM

## 2025-03-21 DIAGNOSIS — S86.812A STRAIN OF CALF MUSCLE, LEFT, INITIAL ENCOUNTER: ICD-10-CM

## 2025-03-31 ENCOUNTER — EVALUATION (OUTPATIENT)
Dept: PHYSICAL THERAPY | Facility: CLINIC | Age: 35
End: 2025-03-31
Payer: COMMERCIAL

## 2025-03-31 DIAGNOSIS — M48.061 LUMBAR FORAMINAL STENOSIS: ICD-10-CM

## 2025-03-31 DIAGNOSIS — M54.16 RADICULOPATHY, LUMBAR REGION: ICD-10-CM

## 2025-03-31 DIAGNOSIS — S86.812A STRAIN OF CALF MUSCLE, LEFT, INITIAL ENCOUNTER: ICD-10-CM

## 2025-03-31 PROCEDURE — 97161 PT EVAL LOW COMPLEX 20 MIN: CPT | Performed by: PHYSICAL THERAPIST

## 2025-03-31 PROCEDURE — 97110 THERAPEUTIC EXERCISES: CPT | Performed by: PHYSICAL THERAPIST

## 2025-03-31 NOTE — PROGRESS NOTES
PT Evaluation     Today's date: 3/31/2025  Patient name: Per Rogers Jr  : 1990  MRN: [id number]  Referring provider: Sydney Molina  Dx:   Encounter Diagnosis     ICD-10-CM    1. Radiculopathy, lumbar region  M54.16 Ambulatory referral to Physical Therapy      2. Lumbar foraminal stenosis  M48.061 Ambulatory referral to Physical Therapy      3. Strain of calf muscle, left, initial encounter  S86.666M Ambulatory referral to Physical Therapy                     Assessment  Impairments: abnormal muscle tone, abnormal or restricted ROM, activity intolerance, impaired physical strength, lacks appropriate home exercise program, pain with function and poor posture     Assessment details: Per Rogers Jr is a 34 y.o. male presenting as an outpatient to [facility] PT w/ c/o lumbosacral pain w/ L lumbar radiculopathy.  In addition, pt presents w/ impaired posture, hypertonicity of surrounding musculature, hypomobility of l/s,decreased ROM, and positive extension directional preference significantly limiting pt's functional ability.  Pt will benefit from skilled PT services to address the above deficits in order to max function to allow pt to achieve goals in PT.  Thank you for the referral of this pt.     Understanding of Dx/Px/POC: good     Prognosis: good    Goals  ST.Pain decreased by 25% in 4 weeks.  2. ROM increased by 25% in 4 weeks.  3. Pt will be independent w/ initial HEP in 1-2 visits.     LT. Decrease pain to 1-2/10 at worst by d/c.  2. Increase ROM to WFL for all deficient movements by d/c.  3.  IADL performance increased to max function by d/c.  4. Recreational performance increased to max function by d/c.  5. Pt will be independent w/ comprehensive HEP by d/c.     Plan  Planned modality interventions: cryotherapy, TENS and thermotherapy: hydrocollator packs  Other planned modality interventions: other modalities PRN    Planned therapy interventions: abdominal trunk  "stabilization, ADL retraining, IADL retraining, functional ROM exercises, graded exercise, home exercise program, manual therapy, neuromuscular re-education, patient education, postural training, therapeutic exercise, therapeutic activities and flexibility  Other planned therapy interventions: other interventions PRN    Frequency: 1-2x/week.  Duration in weeks: 8  Plan of Care beginning date: 3/31/2025  Plan of Care expiration date: 5/26/2025  Treatment plan discussed with: patient        Subjective Evaluation    History of Present Illness  Mechanism of injury: Pt reports to IE w/ c/o lumbosacral pain w/ LLE radicular symptoms (numbness and pain into L gastroc) which began approx 5 years ago of insidious onset w/ progressive worsening.      Pt reports that he had discectomy done 5 years ago which initially helped, but pain started worsening again.  Pt adds that he started to develop gastroc cramping post-op which has been progressively worsening over the years. .     Pt reports having skilled PT tx in the past, but only for approx 1 week and then did HEP.  He states that he has not been focusing much on ex for back recently especially due to 2 y/o child.    Pt was told he may need a fusion in the future, but would like to avoid surgery which is why he is in skilled PT tx at this time.     Pt denies any bowel/bladder issues and/or saddle anesthesias.     Pt reports having more recent MRI of L gastroc (w/in the past month) that he states  demonstrates a muscle strain. However, pt feels strain will not heal due to constant muscle spasm/nerve pain.  He also had l/s MRI on 3/6  which demonstrated \"prior left microdiscectomy at level L5-S1 with enhancing scar tissue in the left lateral recess and epidural space. There is residual or recurrent left central disc protrusion encroaching upon left S1 nerve root. Correlate for left S1 radiculopathy.\"  Patient Goals  Patient goals for therapy: decreased pain    Pain  Current " pain ratin  At worst pain rating: 10  Location: lumbosacral area and LLE  Relieving factors: rest (sitting w/ support)  Exacerbated by: carrying/lifting objects, extended periods of standing/walking, bending, sleeping at night.    Social Support  Steps to enter house: yes (w/ rail)  Stairs in house: no   Lives with: spouse (1 year old child)    Employment status: not working (Pt unable to work due to back issues)    Diagnostic Tests  Abnormal MRI: 3/6: Prior left microdiscectomy at level L5-S1 with enhancing scar tissue in the left lateral recess and epidural space. There is residual or recurrent left central disc protrusion encroaching upon left S1 nerve root. Correlate for left S1 radiculopathy..      Objective     Active Range of Motion     Lumbar   Flexion: Active lumbar flexion: lumbosacral and L gastroc pain.  Restriction level: minimal  Extension: Active lumbar extension: *centralized low back pain.  Restriction level: maximal  Left lateral flexion:  WFL  Right lateral flexion:  WFL  Left rotation:  Restriction level: minimal  Right rotation:  Restriction level: minimal    Additional Active Range of Motion Details  Prone lying (3'): centralization of symptoms and improved LLE pain  BURKE:peripheralization of symptoms and increased back     Strength/Myotome Testing     Left Hip   Planes of Motion   Flexion: 4+    Right Hip   Planes of Motion   Flexion: 4+    Left Knee   Flexion: 5  Extension: 5    Right Knee   Flexion: 5  Extension: 5    Left Ankle/Foot   Dorsiflexion: 5  Plantar flexion: 5    Right Ankle/Foot   Dorsiflexion: 5  Plantar flexion: 5    Additional Strength Details  Heel walking- L4-L5: WNL  Toe walking- S1*unable on L due to severe gastroc pain; pt denies weakness    Tests     Additional Tests Details  Observation/Posture: Pt sits w/ PPT      Palpation: Hypertonicity/tenderness w/ palpation to lumbosacral area and surrounding L PSIS    Dermatomes:  (L/R):   L2: intact, WNL  L3: intact, WNL  L4:  intact, WNL  L5): intact, WNL  S1: intact, WNL    DTR (L/R):   Patellar (L4): 2+/2+  Achilles (S1): 1+/1+    Special Tests (L/R):   SLR: negative/negative  Crossed SLR: negative/negative    PAIVM:   L1: hypomobile, pain  L2: hypomobile, pain  L3: hypomobile, pain  L4: hypomobile, pain  L5; hypomobile    SIJ:   p/a sacral pressure: negative                 Daily Treatment Diary       POC expires Unit limit Auth Expiration date PT/OT/ST + Visit Limit?   5/26/25 BOMN pending BOMN                           Visit/Unit Tracking  AUTH Status:  Date               Pending Used                Remaining                    Diagnosis: Lumbosacral pain w/ LLE radicular symptoms   Precautions: NONE   POC Expires: 5/26/25   Re-evaluation Date: 4/28/25   Visit Count 1/10       Manuals 3/31                                                       Ther Ex 3/31       Pt Ed RB- HEP instruct/handout       LTR        Hip ER stretch        Piriformis Stretch        LAQ- seated n glides        Prone lying                                                Neuro Re-Ed 3/31       Supine TA contraction        Supine marches w/ TA        Supine fall outs w/ TA        Bridges w/ TB resist        clamshells        TB resisted sidestepping        TB resisted hip strengthening        Walkouts - fwd/lat        Lat step ups                       Ther Act  3/31           STS        Box Lifts                                                                                    Modalities             CP PRN

## 2025-04-02 ENCOUNTER — APPOINTMENT (OUTPATIENT)
Dept: PHYSICAL THERAPY | Facility: CLINIC | Age: 35
End: 2025-04-02
Payer: COMMERCIAL

## 2025-04-09 ENCOUNTER — APPOINTMENT (OUTPATIENT)
Dept: PHYSICAL THERAPY | Facility: CLINIC | Age: 35
End: 2025-04-09
Payer: COMMERCIAL

## 2025-04-10 ENCOUNTER — OFFICE VISIT (OUTPATIENT)
Dept: PHYSICAL THERAPY | Facility: CLINIC | Age: 35
End: 2025-04-10
Payer: COMMERCIAL

## 2025-04-10 DIAGNOSIS — S86.812A STRAIN OF CALF MUSCLE, LEFT, INITIAL ENCOUNTER: ICD-10-CM

## 2025-04-10 DIAGNOSIS — M48.061 LUMBAR FORAMINAL STENOSIS: ICD-10-CM

## 2025-04-10 DIAGNOSIS — M54.16 RADICULOPATHY, LUMBAR REGION: Primary | ICD-10-CM

## 2025-04-10 PROCEDURE — 97112 NEUROMUSCULAR REEDUCATION: CPT

## 2025-04-10 PROCEDURE — 97110 THERAPEUTIC EXERCISES: CPT

## 2025-04-10 PROCEDURE — 97140 MANUAL THERAPY 1/> REGIONS: CPT

## 2025-04-10 NOTE — PROGRESS NOTES
"Daily Note     Today's date: 4/10/2025  Patient name: Per Rogers Jr  : 1990  MRN: [id number]  Referring provider: Sydney Molina  Dx:   Encounter Diagnosis     ICD-10-CM    1. Radiculopathy, lumbar region  M54.16       2. Lumbar foraminal stenosis  M48.061       3. Strain of calf muscle, left, initial encounter  S86.335O                      Subjective: pt reports continued pain and stiffness in the back that goes to the L calf.       Objective: See treatment diary below      Assessment: Tolerated treatment well. Patient would benefit from continued PT. Pt did have sx centralization w/ prone lying but had irritated sx with Prone on elbows. Pt did not have relief in sx w/ LAD, did have no pain with traction but no major change. Unable to tolerate LTR due to pain in the calf that caused spasms. Discussed doing his HEP and to add prone lying at home as tolerated. Cueing for proper core stabilization. Increased pain with in calf w/ BKFO.       Plan: Continue per plan of care.      Daily Treatment Diary       POC expires Unit limit Auth Expiration date PT/OT/ST + Visit Limit?   25 BOMN pending BOMN                           Visit/Unit Tracking  AUTH Status:  Date               Pending Used                Remaining                    Diagnosis: Lumbosacral pain w/ LLE radicular symptoms   Precautions: NONE   POC Expires: 25   Re-evaluation Date: 25   Visit Count 1/10 2/10      Manuals 3/31 4/10      LAD  No relief increased pain      traction  No increased pain not a huge relief JH      Nerve glides  JH                              Ther Ex 3/31 4/10      Pt Ed RB- HEP instruct/handout       LTR  pain      Hip ER stretch  30\" x3      Piriformis Stretch  30\" x3      LAQ- seated n glides        Prone lying  2 min      Prone on elbows  pain                                      Neuro Re-Ed 3/31 4/10      Supine TA contraction  10\" x10      Supine marches w/ TA  x10      Supine fall outs w/ " "TA  pain      TA w/ ball squeeze  10\" x10      Bridges w/ TB resist        clamshells        TB resisted sidestepping        TB resisted hip strengthening        Walkouts - fwd/lat        Lat step ups                       Ther Act  3/31  4/10         STS        Box Lifts                                                                                    Modalities             CP PRN                                      "

## 2025-04-14 ENCOUNTER — APPOINTMENT (OUTPATIENT)
Dept: PHYSICAL THERAPY | Facility: CLINIC | Age: 35
End: 2025-04-14
Payer: COMMERCIAL

## 2025-04-16 ENCOUNTER — APPOINTMENT (OUTPATIENT)
Dept: PHYSICAL THERAPY | Facility: CLINIC | Age: 35
End: 2025-04-16
Payer: COMMERCIAL

## 2025-04-17 ENCOUNTER — APPOINTMENT (OUTPATIENT)
Dept: PHYSICAL THERAPY | Facility: CLINIC | Age: 35
End: 2025-04-17
Payer: COMMERCIAL

## 2025-04-17 NOTE — PROGRESS NOTES
"Daily Note     Today's date: 2025  Patient name: Per Rogers Jr  : 1990  MRN: [id number]  Referring provider: Sydney Molina*  Dx: No diagnosis found.               Subjective: pt reports      Objective: See treatment diary below      Assessment: Tolerated treatment well. Patient would benefit from continued PT.      Plan: Continue per plan of care.      Daily Treatment Diary       POC expires Unit limit Auth Expiration date PT/OT/ST + Visit Limit?   25 BOMN pending BOMN                           Visit/Unit Tracking  AUTH Status:  Date               Pending Used                Remaining                    Diagnosis: Lumbosacral pain w/ LLE radicular symptoms   Precautions: NONE   POC Expires: 25   Re-evaluation Date: 25   Visit Count 1/10 2/10 3/10     Manuals 3/31 4/10 4/17     LAD  No relief increased pain      traction  No increased pain not a huge relief JH      Nerve glides  JH                              Ther Ex 3/31 4/10 4/17     Pt Ed RB- HEP instruct/handout       LTR  pain      Hip ER stretch  30\" x3      Piriformis Stretch  30\" x3      LAQ- seated n glides        Prone lying  2 min      Prone on elbows  pain                                      Neuro Re-Ed 3/31 4/10      Supine TA contraction  10\" x10      Supine marches w/ TA  x10      Supine fall outs w/ TA  pain      TA w/ ball squeeze  10\" x10      Bridges w/ TB resist        clamshells        TB resisted sidestepping        TB resisted hip strengthening        Walkouts - fwd/lat        Lat step ups                       Ther Act  3/31  4/10         STS        Box Lifts                                                                                    Modalities             CP PRN                                        "

## 2025-04-21 ENCOUNTER — APPOINTMENT (OUTPATIENT)
Dept: PHYSICAL THERAPY | Facility: CLINIC | Age: 35
End: 2025-04-21
Payer: COMMERCIAL

## 2025-04-23 ENCOUNTER — APPOINTMENT (OUTPATIENT)
Dept: PHYSICAL THERAPY | Facility: CLINIC | Age: 35
End: 2025-04-23
Payer: COMMERCIAL

## 2025-04-28 ENCOUNTER — APPOINTMENT (OUTPATIENT)
Dept: PHYSICAL THERAPY | Facility: CLINIC | Age: 35
End: 2025-04-28
Payer: COMMERCIAL

## 2025-04-30 ENCOUNTER — APPOINTMENT (OUTPATIENT)
Dept: PHYSICAL THERAPY | Facility: CLINIC | Age: 35
End: 2025-04-30
Payer: COMMERCIAL

## 2025-05-15 ENCOUNTER — TELEPHONE (OUTPATIENT)
Dept: PAIN MEDICINE | Facility: CLINIC | Age: 35
End: 2025-05-15

## 2025-05-15 NOTE — TELEPHONE ENCOUNTER
Left MyChart message about no show on 4/30/25, advised pt to contact office if he'd like to reschedule.

## 2025-08-18 ENCOUNTER — TELEPHONE (OUTPATIENT)
Dept: FAMILY MEDICINE CLINIC | Facility: CLINIC | Age: 35
End: 2025-08-18